# Patient Record
Sex: MALE | Race: WHITE | NOT HISPANIC OR LATINO | URBAN - METROPOLITAN AREA
[De-identification: names, ages, dates, MRNs, and addresses within clinical notes are randomized per-mention and may not be internally consistent; named-entity substitution may affect disease eponyms.]

---

## 2019-07-17 ENCOUNTER — AMBULATORY - HEALTHEAST (OUTPATIENT)
Dept: VASCULAR SURGERY | Facility: CLINIC | Age: 84
End: 2019-07-17

## 2019-07-17 DIAGNOSIS — L98.9 SKIN LESION: ICD-10-CM

## 2019-10-16 ENCOUNTER — OFFICE VISIT - HEALTHEAST (OUTPATIENT)
Dept: GERIATRICS | Facility: CLINIC | Age: 84
End: 2019-10-16

## 2019-10-16 ENCOUNTER — RECORDS - HEALTHEAST (OUTPATIENT)
Dept: LAB | Facility: CLINIC | Age: 84
End: 2019-10-16

## 2019-10-16 DIAGNOSIS — E78.5 DYSLIPIDEMIA: ICD-10-CM

## 2019-10-16 DIAGNOSIS — N17.9 ACUTE KIDNEY INJURY SUPERIMPOSED ON CKD (H): ICD-10-CM

## 2019-10-16 DIAGNOSIS — F02.80 LATE ONSET ALZHEIMER'S DISEASE WITHOUT BEHAVIORAL DISTURBANCE (H): ICD-10-CM

## 2019-10-16 DIAGNOSIS — G30.1 LATE ONSET ALZHEIMER'S DISEASE WITHOUT BEHAVIORAL DISTURBANCE (H): ICD-10-CM

## 2019-10-16 DIAGNOSIS — E44.0 MODERATE PROTEIN-CALORIE MALNUTRITION (H): ICD-10-CM

## 2019-10-16 DIAGNOSIS — C61 PROSTATE CANCER (H): ICD-10-CM

## 2019-10-16 DIAGNOSIS — N18.9 ACUTE KIDNEY INJURY SUPERIMPOSED ON CKD (H): ICD-10-CM

## 2019-10-16 DIAGNOSIS — R62.7 FAILURE TO THRIVE IN ADULT: ICD-10-CM

## 2019-10-16 DIAGNOSIS — R53.1 WEAKNESS: ICD-10-CM

## 2019-10-16 DIAGNOSIS — I10 ESSENTIAL HYPERTENSION: ICD-10-CM

## 2019-10-16 RX ORDER — ACETAMINOPHEN 500 MG
1000 TABLET ORAL 3 TIMES DAILY PRN
Status: SHIPPED | COMMUNITY
Start: 2019-10-16

## 2019-10-17 ENCOUNTER — OFFICE VISIT - HEALTHEAST (OUTPATIENT)
Dept: GERIATRICS | Facility: CLINIC | Age: 84
End: 2019-10-17

## 2019-10-17 DIAGNOSIS — F02.80 LATE ONSET ALZHEIMER'S DISEASE WITHOUT BEHAVIORAL DISTURBANCE (H): ICD-10-CM

## 2019-10-17 DIAGNOSIS — N17.9 ACUTE KIDNEY INJURY SUPERIMPOSED ON CKD (H): ICD-10-CM

## 2019-10-17 DIAGNOSIS — C61 PROSTATE CANCER (H): ICD-10-CM

## 2019-10-17 DIAGNOSIS — G30.1 LATE ONSET ALZHEIMER'S DISEASE WITHOUT BEHAVIORAL DISTURBANCE (H): ICD-10-CM

## 2019-10-17 DIAGNOSIS — I10 ESSENTIAL HYPERTENSION: ICD-10-CM

## 2019-10-17 DIAGNOSIS — N18.9 ACUTE KIDNEY INJURY SUPERIMPOSED ON CKD (H): ICD-10-CM

## 2019-10-17 LAB
ANION GAP SERPL CALCULATED.3IONS-SCNC: 5 MMOL/L (ref 5–18)
BUN SERPL-MCNC: 29 MG/DL (ref 8–28)
CALCIUM SERPL-MCNC: 9.6 MG/DL (ref 8.5–10.5)
CHLORIDE BLD-SCNC: 103 MMOL/L (ref 98–107)
CO2 SERPL-SCNC: 26 MMOL/L (ref 22–31)
CREAT SERPL-MCNC: 1.4 MG/DL (ref 0.7–1.3)
GFR SERPL CREATININE-BSD FRML MDRD: 48 ML/MIN/1.73M2
GLUCOSE BLD-MCNC: 116 MG/DL (ref 70–125)
POTASSIUM BLD-SCNC: 3.9 MMOL/L (ref 3.5–5)
SODIUM SERPL-SCNC: 134 MMOL/L (ref 136–145)

## 2019-10-25 ENCOUNTER — OFFICE VISIT - HEALTHEAST (OUTPATIENT)
Dept: GERIATRICS | Facility: CLINIC | Age: 84
End: 2019-10-25

## 2019-10-25 DIAGNOSIS — R53.1 WEAKNESS: ICD-10-CM

## 2019-10-25 DIAGNOSIS — I10 ESSENTIAL HYPERTENSION: ICD-10-CM

## 2019-10-25 DIAGNOSIS — G30.1 LATE ONSET ALZHEIMER'S DISEASE WITHOUT BEHAVIORAL DISTURBANCE (H): ICD-10-CM

## 2019-10-25 DIAGNOSIS — F02.80 LATE ONSET ALZHEIMER'S DISEASE WITHOUT BEHAVIORAL DISTURBANCE (H): ICD-10-CM

## 2019-10-25 DIAGNOSIS — R62.7 FAILURE TO THRIVE IN ADULT: ICD-10-CM

## 2019-10-28 ENCOUNTER — RECORDS - HEALTHEAST (OUTPATIENT)
Dept: LAB | Facility: CLINIC | Age: 84
End: 2019-10-28

## 2019-10-28 LAB
ANION GAP SERPL CALCULATED.3IONS-SCNC: 5 MMOL/L (ref 5–18)
BUN SERPL-MCNC: 33 MG/DL (ref 8–28)
CALCIUM SERPL-MCNC: 9.2 MG/DL (ref 8.5–10.5)
CHLORIDE BLD-SCNC: 103 MMOL/L (ref 98–107)
CO2 SERPL-SCNC: 28 MMOL/L (ref 22–31)
CREAT SERPL-MCNC: 1.32 MG/DL (ref 0.7–1.3)
GFR SERPL CREATININE-BSD FRML MDRD: 51 ML/MIN/1.73M2
GLUCOSE BLD-MCNC: 103 MG/DL (ref 70–125)
POTASSIUM BLD-SCNC: 4.4 MMOL/L (ref 3.5–5)
SODIUM SERPL-SCNC: 136 MMOL/L (ref 136–145)

## 2019-10-29 ENCOUNTER — OFFICE VISIT - HEALTHEAST (OUTPATIENT)
Dept: GERIATRICS | Facility: CLINIC | Age: 84
End: 2019-10-29

## 2019-10-29 DIAGNOSIS — I10 ESSENTIAL HYPERTENSION: ICD-10-CM

## 2019-10-29 DIAGNOSIS — E44.0 MODERATE PROTEIN-CALORIE MALNUTRITION (H): ICD-10-CM

## 2019-10-29 DIAGNOSIS — R50.9 FEVER, UNSPECIFIED FEVER CAUSE: ICD-10-CM

## 2019-10-29 DIAGNOSIS — R53.1 WEAKNESS: ICD-10-CM

## 2019-11-01 ENCOUNTER — RECORDS - HEALTHEAST (OUTPATIENT)
Dept: LAB | Facility: CLINIC | Age: 84
End: 2019-11-01

## 2019-11-04 LAB
ANION GAP SERPL CALCULATED.3IONS-SCNC: 9 MMOL/L (ref 5–18)
BUN SERPL-MCNC: 33 MG/DL (ref 8–28)
CALCIUM SERPL-MCNC: 9.2 MG/DL (ref 8.5–10.5)
CHLORIDE BLD-SCNC: 106 MMOL/L (ref 98–107)
CO2 SERPL-SCNC: 21 MMOL/L (ref 22–31)
CREAT SERPL-MCNC: 1.2 MG/DL (ref 0.7–1.3)
GFR SERPL CREATININE-BSD FRML MDRD: 57 ML/MIN/1.73M2
GLUCOSE BLD-MCNC: 91 MG/DL (ref 70–125)
POTASSIUM BLD-SCNC: 5 MMOL/L (ref 3.5–5)
SODIUM SERPL-SCNC: 136 MMOL/L (ref 136–145)

## 2019-11-05 ENCOUNTER — OFFICE VISIT - HEALTHEAST (OUTPATIENT)
Dept: GERIATRICS | Facility: CLINIC | Age: 84
End: 2019-11-05

## 2019-11-05 DIAGNOSIS — F02.80 LATE ONSET ALZHEIMER'S DISEASE WITHOUT BEHAVIORAL DISTURBANCE (H): ICD-10-CM

## 2019-11-05 DIAGNOSIS — R62.7 FAILURE TO THRIVE IN ADULT: ICD-10-CM

## 2019-11-05 DIAGNOSIS — I10 ESSENTIAL HYPERTENSION: ICD-10-CM

## 2019-11-05 DIAGNOSIS — G30.1 LATE ONSET ALZHEIMER'S DISEASE WITHOUT BEHAVIORAL DISTURBANCE (H): ICD-10-CM

## 2019-11-05 DIAGNOSIS — R53.1 WEAKNESS: ICD-10-CM

## 2019-11-11 ENCOUNTER — OFFICE VISIT - HEALTHEAST (OUTPATIENT)
Dept: GERIATRICS | Facility: CLINIC | Age: 84
End: 2019-11-11

## 2019-11-11 DIAGNOSIS — F02.80 LATE ONSET ALZHEIMER'S DISEASE WITHOUT BEHAVIORAL DISTURBANCE (H): ICD-10-CM

## 2019-11-11 DIAGNOSIS — R62.7 FAILURE TO THRIVE IN ADULT: ICD-10-CM

## 2019-11-11 DIAGNOSIS — E78.5 DYSLIPIDEMIA: ICD-10-CM

## 2019-11-11 DIAGNOSIS — G30.1 LATE ONSET ALZHEIMER'S DISEASE WITHOUT BEHAVIORAL DISTURBANCE (H): ICD-10-CM

## 2019-11-11 DIAGNOSIS — R53.1 WEAKNESS: ICD-10-CM

## 2019-11-13 ENCOUNTER — AMBULATORY - HEALTHEAST (OUTPATIENT)
Dept: GERIATRICS | Facility: CLINIC | Age: 84
End: 2019-11-13

## 2020-02-21 ENCOUNTER — COMMUNICATION - HEALTHEAST (OUTPATIENT)
Dept: GERIATRICS | Facility: CLINIC | Age: 85
End: 2020-02-21

## 2020-02-24 ENCOUNTER — OFFICE VISIT - HEALTHEAST (OUTPATIENT)
Dept: GERIATRICS | Facility: CLINIC | Age: 85
End: 2020-02-24

## 2020-02-24 DIAGNOSIS — C61 PROSTATE CANCER (H): ICD-10-CM

## 2020-02-24 DIAGNOSIS — N39.0 URINARY TRACT INFECTION WITHOUT HEMATURIA, SITE UNSPECIFIED: ICD-10-CM

## 2020-02-24 DIAGNOSIS — E44.0 MODERATE PROTEIN-CALORIE MALNUTRITION (H): ICD-10-CM

## 2020-02-24 DIAGNOSIS — R53.1 WEAKNESS: ICD-10-CM

## 2020-02-25 ENCOUNTER — OFFICE VISIT - HEALTHEAST (OUTPATIENT)
Dept: GERIATRICS | Facility: CLINIC | Age: 85
End: 2020-02-25

## 2020-02-25 DIAGNOSIS — R53.1 WEAKNESS: ICD-10-CM

## 2020-02-25 DIAGNOSIS — F02.80 LATE ONSET ALZHEIMER'S DISEASE WITHOUT BEHAVIORAL DISTURBANCE (H): ICD-10-CM

## 2020-02-25 DIAGNOSIS — I10 ESSENTIAL HYPERTENSION: ICD-10-CM

## 2020-02-25 DIAGNOSIS — N30.00 ACUTE CYSTITIS WITHOUT HEMATURIA: ICD-10-CM

## 2020-02-25 DIAGNOSIS — G30.1 LATE ONSET ALZHEIMER'S DISEASE WITHOUT BEHAVIORAL DISTURBANCE (H): ICD-10-CM

## 2020-02-27 ENCOUNTER — OFFICE VISIT - HEALTHEAST (OUTPATIENT)
Dept: GERIATRICS | Facility: CLINIC | Age: 85
End: 2020-02-27

## 2020-02-27 DIAGNOSIS — G30.1 LATE ONSET ALZHEIMER'S DISEASE WITHOUT BEHAVIORAL DISTURBANCE (H): ICD-10-CM

## 2020-02-27 DIAGNOSIS — F02.80 LATE ONSET ALZHEIMER'S DISEASE WITHOUT BEHAVIORAL DISTURBANCE (H): ICD-10-CM

## 2020-02-27 DIAGNOSIS — S99.829A TOENAIL TORN AWAY: ICD-10-CM

## 2020-02-27 DIAGNOSIS — R62.7 FAILURE TO THRIVE IN ADULT: ICD-10-CM

## 2020-03-04 ENCOUNTER — OFFICE VISIT - HEALTHEAST (OUTPATIENT)
Dept: GERIATRICS | Facility: CLINIC | Age: 85
End: 2020-03-04

## 2020-03-04 DIAGNOSIS — R62.7 FAILURE TO THRIVE IN ADULT: ICD-10-CM

## 2020-03-04 DIAGNOSIS — I10 ESSENTIAL HYPERTENSION: ICD-10-CM

## 2020-03-04 DIAGNOSIS — R53.1 WEAKNESS: ICD-10-CM

## 2020-03-10 ENCOUNTER — OFFICE VISIT - HEALTHEAST (OUTPATIENT)
Dept: GERIATRICS | Facility: CLINIC | Age: 85
End: 2020-03-10

## 2020-03-10 DIAGNOSIS — R62.7 FAILURE TO THRIVE IN ADULT: ICD-10-CM

## 2020-03-10 DIAGNOSIS — N39.0 URINARY TRACT INFECTION WITHOUT HEMATURIA, SITE UNSPECIFIED: ICD-10-CM

## 2020-03-10 DIAGNOSIS — R53.1 WEAKNESS: ICD-10-CM

## 2020-03-17 ENCOUNTER — OFFICE VISIT - HEALTHEAST (OUTPATIENT)
Dept: GERIATRICS | Facility: CLINIC | Age: 85
End: 2020-03-17

## 2020-03-17 DIAGNOSIS — G30.1 LATE ONSET ALZHEIMER'S DISEASE WITHOUT BEHAVIORAL DISTURBANCE (H): ICD-10-CM

## 2020-03-17 DIAGNOSIS — R53.1 WEAKNESS: ICD-10-CM

## 2020-03-17 DIAGNOSIS — R62.7 FAILURE TO THRIVE IN ADULT: ICD-10-CM

## 2020-03-17 DIAGNOSIS — F02.80 LATE ONSET ALZHEIMER'S DISEASE WITHOUT BEHAVIORAL DISTURBANCE (H): ICD-10-CM

## 2020-03-17 DIAGNOSIS — J90 PLEURAL EFFUSION: ICD-10-CM

## 2020-03-25 ENCOUNTER — OFFICE VISIT - HEALTHEAST (OUTPATIENT)
Dept: GERIATRICS | Facility: CLINIC | Age: 85
End: 2020-03-25

## 2020-03-25 DIAGNOSIS — R53.1 WEAKNESS: ICD-10-CM

## 2020-03-25 DIAGNOSIS — I10 ESSENTIAL HYPERTENSION: ICD-10-CM

## 2020-03-25 DIAGNOSIS — R62.7 FAILURE TO THRIVE IN ADULT: ICD-10-CM

## 2020-04-02 ENCOUNTER — OFFICE VISIT - HEALTHEAST (OUTPATIENT)
Dept: GERIATRICS | Facility: CLINIC | Age: 85
End: 2020-04-02

## 2020-04-02 DIAGNOSIS — F02.80 LATE ONSET ALZHEIMER'S DISEASE WITHOUT BEHAVIORAL DISTURBANCE (H): ICD-10-CM

## 2020-04-02 DIAGNOSIS — N39.0 URINARY TRACT INFECTION WITHOUT HEMATURIA, SITE UNSPECIFIED: ICD-10-CM

## 2020-04-02 DIAGNOSIS — I10 ESSENTIAL HYPERTENSION: ICD-10-CM

## 2020-04-02 DIAGNOSIS — N28.9 KIDNEY DISEASE: ICD-10-CM

## 2020-04-02 DIAGNOSIS — G30.1 LATE ONSET ALZHEIMER'S DISEASE WITHOUT BEHAVIORAL DISTURBANCE (H): ICD-10-CM

## 2020-04-06 ENCOUNTER — AMBULATORY - HEALTHEAST (OUTPATIENT)
Dept: GERIATRICS | Facility: CLINIC | Age: 85
End: 2020-04-06

## 2021-05-26 VITALS
TEMPERATURE: 98.4 F | OXYGEN SATURATION: 96 % | HEART RATE: 80 BPM | DIASTOLIC BLOOD PRESSURE: 62 MMHG | RESPIRATION RATE: 20 BRPM | SYSTOLIC BLOOD PRESSURE: 124 MMHG

## 2021-06-02 NOTE — PROGRESS NOTES
[facility]    Facility:   [facility] [[id number]]   Code Status: FULL CODE      CHIEF COMPLAINT/REASON FOR VISIT:  Chief Complaint   Patient presents with     Review Of Multiple Medical Conditions       HISTORY:      HPI: Federico is a 89 y.o. male undergoing physical and occupational therapy at [facility]. He is with a history of: prostate cancer, dementia and hypertension.  He was admitted on 10/11/2019, through [place] [facility], with failure to thrive and poor oral intake. CT head revealed no acute pathology.In the ER, he was found to be in SALLY.  He was provided IVF and Lisinopril was discontinued. Creatinine has returned to baseline.  Lactate was elevated and normalized quickly. There was no evidence for acute infectious process.    Today is seen for a routine visit to review multiple medical issues.  He denies chest pain or shortness of breath.  He is moving his bowels and denied urinary issues.  He is being followed closely by dietary and placed on supplements. He did have a recent 2 pound weight loss but overall he is up 10 pounds since 10/14/19.  Slightly abnormal BMP on 10/17 and as of 10/28/19 his creatinine has improved from 1.40 to 1.32 and GRF from 48 to 51. Labs to be rechecked on 11/4/19.  He contiues to  be oriented to himself and pleasantly confused.          Past Medical History:   Diagnosis Date     Cholelithiases     pt. unaware     CTS (carpal tunnel syndrome)     bilateral     HTN (hypertension)      IGT (impaired glucose tolerance)      Loss of hearing     bilateral hearing aids     Prostate cancer (H)      Renal insufficiency      Renal insufficiency              Family History   Problem Relation Age of Onset     Cancer Father      Heart disease Father      Heart disease Sister      Stroke Brother      Heart disease Sister      Social History     Socioeconomic History     Marital status:      Spouse name: Not on file     Number  of children: Not on file     Years of education: Not on file     Highest education level: Not on file   Occupational History     Not on file   Social Needs     Financial resource strain: Not on file     Food insecurity:     Worry: Not on file     Inability: Not on file     Transportation needs:     Medical: Not on file     Non-medical: Not on file   Tobacco Use     Smoking status: Never Smoker     Smokeless tobacco: Never Used   Substance and Sexual Activity     Alcohol use: Yes     Comment: once a month or less     Drug use: No     Sexual activity: Not on file   Lifestyle     Physical activity:     Days per week: Not on file     Minutes per session: Not on file     Stress: Not on file   Relationships     Social connections:     Talks on phone: Not on file     Gets together: Not on file     Attends Taoism service: Not on file     Active member of club or organization: Not on file     Attends meetings of clubs or organizations: Not on file     Relationship status: Not on file     Intimate partner violence:     Fear of current or ex partner: Not on file     Emotionally abused: Not on file     Physically abused: Not on file     Forced sexual activity: Not on file   Other Topics Concern     Not on file   Social History Narrative     Not on file         Review of Systems   Constitutional: Positive for activity change and appetite change. Negative for chills, fatigue and fever.        As current diagnosis of failure to thrive being followed closely by dietary.   HENT: Negative for congestion and sore throat.    Eyes: Negative for visual disturbance.   Respiratory: Negative for cough, shortness of breath and wheezing.    Cardiovascular: Negative for chest pain and leg swelling.   Gastrointestinal: Negative for abdominal distention, abdominal pain, constipation, diarrhea and nausea.   Genitourinary: Negative for dysuria.   Musculoskeletal: Negative for arthralgias and myalgias.   Skin: Negative for color change, rash and  wound.   Neurological: Negative for dizziness, weakness and numbness.   Psychiatric/Behavioral: Negative for agitation, behavioral problems and sleep disturbance.       Vitals:    10/29/19 0737   BP: 128/62   Pulse: 85   Resp: 16   Temp: 100.1  F (37.8  C)   SpO2: 98%   Weight: 155 lb 6.4 oz (70.5 kg)       Physical Exam  Constitutional:       Appearance: He is well-developed.      Comments: Pleasant gentleman in no acute distress   HENT:      Head: Normocephalic.   Eyes:      Conjunctiva/sclera: Conjunctivae normal.   Neck:      Musculoskeletal: Normal range of motion.   Cardiovascular:      Rate and Rhythm: Normal rate and regular rhythm.      Heart sounds: Normal heart sounds. No murmur.   Pulmonary:      Effort: No respiratory distress.      Breath sounds: Normal breath sounds. No wheezing or rales.   Abdominal:      General: Bowel sounds are normal. There is no distension.      Palpations: Abdomen is soft.      Tenderness: There is no tenderness.   Musculoskeletal: Normal range of motion.   Skin:     General: Skin is warm.   Neurological:      Mental Status: He is alert and oriented to person, place, and time.   Psychiatric:         Behavior: Behavior normal.           LABS:   Recent Results (from the past 240 hour(s))   Basic Metabolic Panel   Result Value Ref Range    Sodium 136 136 - 145 mmol/L    Potassium 4.4 3.5 - 5.0 mmol/L    Chloride 103 98 - 107 mmol/L    CO2 28 22 - 31 mmol/L    Anion Gap, Calculation 5 5 - 18 mmol/L    Glucose 103 70 - 125 mg/dL    Calcium 9.2 8.5 - 10.5 mg/dL    BUN 33 (H) 8 - 28 mg/dL    Creatinine 1.32 (H) 0.70 - 1.30 mg/dL    GFR MDRD Af Amer >60 >60 mL/min/1.73m2    GFR MDRD Non Af Amer 51 (L) >60 mL/min/1.73m2        Current Outpatient Medications   Medication Sig     acetaminophen (TYLENOL) 500 MG tablet Take 1,000 mg by mouth 3 (three) times a day as needed for pain.     aspirin 81 MG EC tablet Take 81 mg by mouth every evening.            docoshexanoic acid-eicosapent 500  mg (FISH OIL) 500-100 mg cap capsule Take 500 mg by mouth daily.     enzalutamide (XTANDI) 40 mg cap Take 1 tablet by mouth daily.     leuprolide (LUPRON) 30 mg injection Inject 30 mg into the shoulder, thigh, or buttocks every 6 (six) months.      multivitamin therapeutic tablet Take 1 tablet by mouth daily.   .ASSESSMENT:      ICD-10-CM    1. Weakness R53.1    2. Essential hypertension I10    3. Fever, unspecified fever cause R50.9    4. Moderate protein-calorie malnutrition (H) E44.0        PLAN:    Weakness PT OT    Hypertension currently off of his lisinopril and blood pressure stable at 128/62    Dementia noted to be oriented to self only pleasantly confused.  Provide a safe and comfortable environment    Failure to thrive being followed closely by dietary currently showing a 2 pound weight loss in the last 10 days, on supplements but overall up 10 pounds since 10/14/19.     Acute kidney injury last BMP was done 10/28/19 Creatine and GFR improving  on 1017 and labs are improving.    Continue to monitor labs    Electronically signed by: Elissa Diaz, CNP

## 2021-06-02 NOTE — PROGRESS NOTES
[facility]    Facility:   [facility] [[id number]]   Code Status: FULL CODE      CHIEF COMPLAINT/REASON FOR VISIT:  Chief Complaint   Patient presents with     Review Of Multiple Medical Conditions       HISTORY:      HPI: Federico is a 89 y.o. male undergoing physical and occupational therapy at [facility]. He is with a history of: prostate cancer, dementia and hypertension.  He was admitted on 10/11/2019, through [place] [facility], with failure to thrive and poor oral intake. CT head revealed no acute pathology.In the ER, he was found to be in SALLY.  He was provided IVF and Lisinopril was discontinued. Creatinine has returned to baseline.  Lactate was elevated and normalized quickly. There was no evidence for acute infectious process.    Today is seen for a routine visit to review multiple medical issues.  He denies chest pain or shortness of breath.  He is moving his bowels and denied urinary issues.  He is being followed closely by dietary and placed on supplements however he does show a 2  pound weight loss in the last 10 days and I did update dietary on this finding.  Slightly abnormal BMP on 10/17 and this is being  monitored.  He is noted to be oriented to himself and pleasantly confused.          Past Medical History:   Diagnosis Date     Cholelithiases     pt. unaware     CTS (carpal tunnel syndrome)     bilateral     HTN (hypertension)      IGT (impaired glucose tolerance)      Loss of hearing     bilateral hearing aids     Prostate cancer (H)      Renal insufficiency      Renal insufficiency              Family History   Problem Relation Age of Onset     Cancer Father      Heart disease Father      Heart disease Sister      Stroke Brother      Heart disease Sister      Social History     Socioeconomic History     Marital status:      Spouse name: Not on file     Number of children: Not on file     Years of education: Not on file     Highest  education level: Not on file   Occupational History     Not on file   Social Needs     Financial resource strain: Not on file     Food insecurity:     Worry: Not on file     Inability: Not on file     Transportation needs:     Medical: Not on file     Non-medical: Not on file   Tobacco Use     Smoking status: Never Smoker     Smokeless tobacco: Never Used   Substance and Sexual Activity     Alcohol use: Yes     Comment: once a month or less     Drug use: No     Sexual activity: Not on file   Lifestyle     Physical activity:     Days per week: Not on file     Minutes per session: Not on file     Stress: Not on file   Relationships     Social connections:     Talks on phone: Not on file     Gets together: Not on file     Attends Orthodox service: Not on file     Active member of club or organization: Not on file     Attends meetings of clubs or organizations: Not on file     Relationship status: Not on file     Intimate partner violence:     Fear of current or ex partner: Not on file     Emotionally abused: Not on file     Physically abused: Not on file     Forced sexual activity: Not on file   Other Topics Concern     Not on file   Social History Narrative     Not on file         Review of Systems   Constitutional: Positive for activity change and appetite change. Negative for chills, fatigue and fever.        As current diagnosis of failure to thrive being followed closely by dietary.   HENT: Negative for congestion and sore throat.    Eyes: Negative for visual disturbance.   Respiratory: Negative for cough, shortness of breath and wheezing.    Cardiovascular: Negative for chest pain and leg swelling.   Gastrointestinal: Negative for abdominal distention, abdominal pain, constipation, diarrhea and nausea.   Genitourinary: Negative for dysuria.   Musculoskeletal: Negative for arthralgias and myalgias.   Skin: Negative for color change, rash and wound.   Neurological: Negative for dizziness, weakness and numbness.    Psychiatric/Behavioral: Negative for agitation, behavioral problems and sleep disturbance.       Vitals:    10/25/19 0729   BP: 122/78   Pulse: 78   Resp: 16   Temp: 97.3  F (36.3  C)   SpO2: 98%   Weight: 143 lb 6.4 oz (65 kg)       Physical Exam  Constitutional:       Appearance: He is well-developed.      Comments: Pleasant gentleman in no acute distress   HENT:      Head: Normocephalic.   Eyes:      Conjunctiva/sclera: Conjunctivae normal.   Neck:      Musculoskeletal: Normal range of motion.   Cardiovascular:      Rate and Rhythm: Normal rate and regular rhythm.      Heart sounds: Normal heart sounds. No murmur.   Pulmonary:      Effort: No respiratory distress.      Breath sounds: Normal breath sounds. No wheezing or rales.   Abdominal:      General: Bowel sounds are normal. There is no distension.      Palpations: Abdomen is soft.      Tenderness: There is no tenderness.   Musculoskeletal: Normal range of motion.   Skin:     General: Skin is warm.   Neurological:      Mental Status: He is alert and oriented to person, place, and time.   Psychiatric:         Behavior: Behavior normal.           LABS:   No results found for this or any previous visit (from the past 240 hour(s)).     Current Outpatient Medications   Medication Sig     acetaminophen (TYLENOL) 500 MG tablet Take 1,000 mg by mouth 3 (three) times a day as needed for pain.     aspirin 81 MG EC tablet Take 81 mg by mouth every evening.            docoshexanoic acid-eicosapent 500 mg (FISH OIL) 500-100 mg cap capsule Take 500 mg by mouth daily.     enzalutamide (XTANDI) 40 mg cap Take 1 tablet by mouth daily.     leuprolide (LUPRON) 30 mg injection Inject 30 mg into the shoulder, thigh, or buttocks every 6 (six) months.      multivitamin therapeutic tablet Take 1 tablet by mouth daily.   .ASSESSMENT:      ICD-10-CM    1. Weakness R53.1    2. Essential hypertension I10    3. Late onset Alzheimer's disease without behavioral disturbance (H) G30.1      F02.80    4. Failure to thrive in adult R62.7        PLAN:    Weakness PT OT    Hypertension currently off of his lisinopril and blood pressure stable at 122/78    Dementia noted to be oriented to self only pleasantly confused.  Provide a safe and comfortable environment    Failure to thrive being followed closely by dietary currently showing a 2 pound weight loss in the last 10 days, on supplements    Acute kidney injury last BMP was done on 1017 and labs are improving.  His creatinine is down to 1.40 from 1.71 and his GFR is up to 48 from 17.  Continue to monitor labs    Electronically signed by: Elissa Diaz, CNP

## 2021-06-02 NOTE — PROGRESS NOTES
[facility]    DATE: 10/16/2019    NAME:  Federico Epps             :  10/26/1930  MRN: [id number]  CODE STATUS:  FULL CODE    VISIT TYPE: Problem Visit (hospital f/u)     FACILITY:  [facility] [[id number]]       CHIEF COMPLAIN/REASON FOR VISIT:    Chief Complaint   Patient presents with     Problem Visit     hospital f/u               HISTORY OF PRESENT ILLNESS: Federico Epps is a 88 y.o. male who was admitted to [facility] 10/11-10/14 for anorexia, weakness, failure to thrive. He was treated for SALLY on CKD with IV fluids. Lisinopril was discontinued due to SALLY. Infectious workup was negative. He was recommended to discharge to TCU for rehab. He has PMH of prostate cancer, dementia, hypertension. Prior to this he lived with wife.     Today Mr. Epps is seen for hospital follow up today. He is seen in wheelchair in his room with no family present. He is confused but calm and cooperative. He says he sleeps fine and has no pain. He thinks his appetite is good and has not had any stomach upset or nausea. He denies shortness of breath or cough. He doesn't know how therapy is going. He thinks bowels are ok but doesn't know when he last went. He is not sure if he has eaten today. He is unable to state where he is, why he is here, or what month or year it is. He is quite hard of hearing as well but has hearing aids in place. Per staff he has been quiet and not complaining of anything. Staff denies any concerns with him. His vitals have been stable and admission weight was 145lbs. He is not on many medications. He has a bmp that was ordered by the hospital for tomorrow. His appetite is fair per staff.     REVIEW OF SYSTEMS:  PROBLEMS AND REVIEW OF SYSTEMS:   Today on ROS per staff and patient:   Currently, no fever, chills, or rigors. Decreased vision and hearing. Denies any chest pain, dizziness, shortness of breath, or cough.  Positive for  weakness, confusion, forgetfulness, appetite fair, unable to obtain further ROS due to cognition      Allergies   Allergen Reactions     Other Food Allergy Anaphylaxis     Peanuts caused edema / throat swelling / closing     Current Outpatient Medications   Medication Sig     acetaminophen (TYLENOL) 500 MG tablet Take 1,000 mg by mouth 3 (three) times a day as needed for pain.     enzalutamide (XTANDI) 40 mg cap Take 1 tablet by mouth daily.     multivitamin therapeutic tablet Take 1 tablet by mouth daily.     aspirin 81 MG EC tablet Take 81 mg by mouth every evening.            docoshexanoic acid-eicosapent 500 mg (FISH OIL) 500-100 mg cap capsule Take 500 mg by mouth daily.     leuprolide (LUPRON) 30 mg injection Inject 30 mg into the shoulder, thigh, or buttocks every 6 (six) months.      Past Medical History:    Past Medical History:   Diagnosis Date     Cholelithiases     pt. unaware     CTS (carpal tunnel syndrome)     bilateral     HTN (hypertension)      IGT (impaired glucose tolerance)      Loss of hearing     bilateral hearing aids     Prostate cancer (H)      Renal insufficiency      Renal insufficiency            PHYSICAL EXAMINATION  Vitals:    10/16/19 0700   BP: 124/58   Pulse: 67   Resp: 18   Temp: 98.7  F (37.1  C)   SpO2: 93%   Weight: 145 lb (65.8 kg)       Today on physical exam:     GENERAL: Awake, Alert, not in any form of acute distress, answers questions appropriately, follows simple commands  HEENT: Head is normocephalic with normal hair distribution. No evidence of trauma. Ears: No acute purulent discharge. Eyes: Conjunctivae pink with no scleral jaundice. Nose: Normal mucosa and septum. NECK: Supple with no cervical or supraclavicular lymphadenopathy. Trachea is midline. [place]  CHEST: No tenderness or deformity, no crepitus  LUNG: dim to auscultation with good chest expansion. There are no crackles or wheezes, normal AP diameter. No shortness of breath or cough noted  BACK: No kyphosis of  the thoracic spine. Symmetric, no curvature, ROM normal, no CVA tenderness, no spinal tenderness   CVS: There is good S1  S2, regular rhythm, there are no murmurs, rubs, gallops, or heaves,  2+ pulses symmetric in all extremities.  ABDOMEN: Rounded and soft, nontender to palpation, non distended, no masses, no organomegaly, good bowel sounds, no rebound or guarding, no peritoneal signs.   EXTREMITIES: trace ble pedal edema, Atraumatic. Full range of motion on both upper and lower extremities, there is no tenderness to palpation, no cyanosis or clubbing, no calf tenderness.  SKIN: Warm and dry, no erythema noted.  Skin color, texture, no rashes or lesions.  NEURO/PSYCH: The patient is oriented to person, pleasantly confused, flat affect, socially withdrawn, some delayed responses, weakness.            LABS:   No results found for this or any previous visit (from the past 168 hour(s)).  Results for orders placed or performed in visit on 10/21/15   Basic Metabolic Panel   Result Value Ref Range    Sodium 138 136 - 145 mmol/L    Potassium 4.7 3.5 - 5.0 mmol/L    Chloride 101 98 - 107 mmol/L    CO2 26 22 - 31 mmol/L    Anion Gap, Calculation 11 5 - 18 mmol/L    Glucose 135 (H) 70 - 125 mg/dL    Calcium 10.1 8.5 - 10.5 mg/dL    BUN 25 8 - 28 mg/dL    Creatinine 1.45 (H) 0.70 - 1.30 mg/dL    GFR MDRD Af Amer 56 (L) >60 mL/min/1.73m2    GFR MDRD Non Af Amer 46 (L) >60 mL/min/1.73m2         Lab Results   Component Value Date    WBC 9.0 10/21/2015    HGB 11.2 (L) 10/21/2015    HCT 33.6 (L) 10/21/2015    MCV 89 10/21/2015     10/21/2015       No results found for: [id number]  No results found for: HGBA1C  Lab Results   Component Value Date    INR 1.02 09/22/2015     No results found for: [id number]  No results found for: TSH        ASSESSMENT/PLAN:    1. SALLY on CKD: Lisinopril stopped. Cr improved to 1.71 by 10/13. Imer 3.37, gfr 17. Bmp already ordered for 10/17. Encourage fluids, monitor.   2. Failure to thrive,  malnutrition: unclear amount of weight loss over last few months but poor intake at home. Per pcp note on 10/11 had lost 10lbs over last 2 months. Appetite fair here. Admission weight 145lbs. Monitor weights weekly. Dietary consult to follow for preferences and supplement recs.   3. HTN: SBP running 100-140s, 120s today. Overall well controlled off lisinopril. Monitor for now.   4. Dementia: a/o x 1 today. Unclear baseline as no family present. Supportive cares, monitor. No agitation, behaviors overnight. Provide safe environment.  5. Prostate cancer: On lupron injections q4 months, xtandi daily (family to supply). No concerns today.   6. HLD: On aspirin, fish oil. Unclear if aspirin for primary prevention. Would consider risk v benefit but defer to primary provider.   7. Pain, fever: tylenol ordered prn. No complaints today.   7.   Electronically signed by: Vanessa Jensen NP          Total floor/unit time spent 35 with 25 time spent on counseling and coordination of care. Counseling was done regarding hospital course, med changes, management of SALLY on CKD, hypertension management, dementia management and safety concerns, therapy needs. coordinated care with nursing for management of sally on ckd, weakness, dementia, hypertension, lab monitoring.

## 2021-06-02 NOTE — PROGRESS NOTES
[facility]      Facility:    [facility] [[id number]]  Code Status: FULL CODE       Chief Complaint/Reason for Visit:  Chief Complaint   Patient presents with     H & P     Admit note to TCU-FTT, prostate cancer, dementia, SALLY on CKD.       HPI:   Federico is a 89 y.o. male with hx of prostate cancer, HTN, dementia, presented to the hospital on 10/11/19 with info as noted below.     Hospital Course:   Mr. Federico Epps is an 88 y.o. male with a history of: prostate cancer, dementia and hypertension.  He was admitted on 10/11/2019, through [place] [facility], with failure to thrive and poor oral intake. He is a rather poor historian due to underlying dementia. He was admitted to inpatient. CT head revealed no acute pathology. In the ER, he was found to be in SALLY.  He was provided IVF and Lisinopril was discontinued. Creatinine has returned to baseline. Lactate was elevated and normalized quickly. There was no evidence for acute infectious process. Therapies were consulted and recommended TCU at discharge. Family were in agreement. Lisinopril was discontinued due to SALLY/CKD and his blood pressure has been stable. Could consider alternative antihypertensive agent, if needed.    Overall stabilized and discharged to TCU on 10/14/19 for PT, OT, nursing cares, medical management and monitoring.     Today:  Due to dementia, unable to obtain full accurate ROS. No family present at time of visit. Nursing reports no concerns. He denies any complaints. No hypoxia or need for oxygen. No fever or cough noted. Appetite is fair. No vomiting, diarrhea constipation. No complaints of abdominal pain. He denies shortness of breath or chest pain. He is [place], no reported new vision problems.       Past Medical History:  Past Medical History:   Diagnosis Date     Cholelithiases     pt. unaware     CTS (carpal tunnel syndrome)     bilateral     HTN (hypertension)      IGT (impaired glucose tolerance)       Loss of hearing     bilateral hearing aids     Prostate cancer (H)      Renal insufficiency      Renal insufficiency            Surgical History:  Past Surgical History:   Procedure Laterality Date     CATARACT EXTRACTION Bilateral      HERNIA REPAIR Left      MI REVISE MEDIAN N/CARPAL TUNNEL SURG Right 3/31/2015    Procedure: RIGHT CARPAL TUNNEL RELEASE;  Surgeon: Henry Mora MD;  Location: [facility];  Service: Orthopedics     PROSTATECTOMY  2010    cryoablation     TOTAL HIP ARTHROPLASTY Bilateral        Family History:   Family History   Problem Relation Age of Onset     Cancer Father      Heart disease Father      Heart disease Sister      Stroke Brother      Heart disease Sister        Social History:    Social History     Socioeconomic History     Marital status:      Spouse name: Not on file     Number of children: Not on file     Years of education: Not on file     Highest education level: Not on file   Occupational History     Not on file   Social Needs     Financial resource strain: Not on file     Food insecurity:     Worry: Not on file     Inability: Not on file     Transportation needs:     Medical: Not on file     Non-medical: Not on file   Tobacco Use     Smoking status: Never Smoker     Smokeless tobacco: Never Used   Substance and Sexual Activity     Alcohol use: Yes     Comment: once a month or less     Drug use: No     Sexual activity: Not on file   Lifestyle     Physical activity:     Days per week: Not on file     Minutes per session: Not on file     Stress: Not on file   Relationships     Social connections:     Talks on phone: Not on file     Gets together: Not on file     Attends Congregation service: Not on file     Active member of club or organization: Not on file     Attends meetings of clubs or organizations: Not on file     Relationship status: Not on file     Intimate partner violence:     Fear of current or ex partner: Not on file     Emotionally abused: Not on  file     Physically abused: Not on file     Forced sexual activity: Not on file   Other Topics Concern     Not on file   Social History Narrative     Not on file       Medications:  Current Outpatient Medications   Medication Sig     acetaminophen (TYLENOL) 500 MG tablet Take 1,000 mg by mouth 3 (three) times a day as needed for pain.     aspirin 81 MG EC tablet Take 81 mg by mouth every evening.            docoshexanoic acid-eicosapent 500 mg (FISH OIL) 500-100 mg cap capsule Take 500 mg by mouth daily.     enzalutamide (XTANDI) 40 mg cap Take 1 tablet by mouth daily.     leuprolide (LUPRON) 30 mg injection Inject 30 mg into the shoulder, thigh, or buttocks every 6 (six) months.      multivitamin therapeutic tablet Take 1 tablet by mouth daily.       Allergies:  Allergies   Allergen Reactions     Other Food Allergy Anaphylaxis     Peanuts caused edema / throat swelling / closing        Review of Systems:  Pertinent items are noted in HPI. unable to complete due to dementia.      Physical Exam:   General: Patient is alert elderly male, no distress.   Vitals: /62, Temp 98.8, Pulse 77 , RR 18, O2 sat 100% RA.  HEENT: Head is NCAT. Eyes show no injection or icterus. Nares negative. Oropharynx well hydrated.  Neck: Supple. No tenderness or adenopathy. No JVD.  Lungs: Clear bilaterally. No wheezes.  Cardiovascular: Regular rate and rhythm, normal S1, S2.  Back: No spinal or CVA tenderness.  Abdomen: Soft, no tenderness on exam. Bowel sounds present. No guarding rebound or rigidity.  : Deferred.  Extremities: No edema is noted.  Musculoskeletal: Age related degen changes.  Skin: Warm and dry.   Psych: Difficult to assess due to dementia.      Labs:  Results for orders placed or performed in visit on 10/17/19   Basic Metabolic Panel   Result Value Ref Range    Sodium 134 (L) 136 - 145 mmol/L    Potassium 3.9 3.5 - 5.0 mmol/L    Chloride 103 98 - 107 mmol/L    CO2 26 22 - 31 mmol/L    Anion Gap, Calculation 5 5 - 18  mmol/L    Glucose 116 70 - 125 mg/dL    Calcium 9.6 8.5 - 10.5 mg/dL    BUN 29 (H) 8 - 28 mg/dL    Creatinine 1.40 (H) 0.70 - 1.30 mg/dL    GFR MDRD Af Amer 58 (L) >60 mL/min/1.73m2    GFR MDRD Non Af Amer 48 (L) >60 mL/min/1.73m2       Assessment/Plan:  1. Dementia. Not on dementia meds. Confused at baseline. Lives with wife.  2. HTN. BPs satisfactory. Not currently on BP meds,  lisinopril was discontinued in the hospital. Continue to monitor for any need for meds.  3. Prostate cancer. He is on Xtandi. Follow up per usual with urology.  4. SALLY. On CKD. Labs as noted above. Check periodically.  5. FTT. Overall not doing well at home, advanced age, dementia. SW involved for consideration of alternative living situation.  6. Code status is full code.          Electronically signed by: Yahaira Carmen MD

## 2021-06-03 VITALS
BODY MASS INDEX: 22.18 KG/M2 | RESPIRATION RATE: 20 BRPM | HEART RATE: 78 BPM | TEMPERATURE: 98.3 F | SYSTOLIC BLOOD PRESSURE: 146 MMHG | WEIGHT: 141.6 LBS | OXYGEN SATURATION: 92 % | DIASTOLIC BLOOD PRESSURE: 70 MMHG

## 2021-06-03 VITALS
RESPIRATION RATE: 16 BRPM | WEIGHT: 143.4 LBS | OXYGEN SATURATION: 98 % | DIASTOLIC BLOOD PRESSURE: 78 MMHG | TEMPERATURE: 97.3 F | HEART RATE: 78 BPM | SYSTOLIC BLOOD PRESSURE: 122 MMHG | BODY MASS INDEX: 22.46 KG/M2

## 2021-06-03 VITALS
RESPIRATION RATE: 16 BRPM | WEIGHT: 155.4 LBS | BODY MASS INDEX: 24.34 KG/M2 | TEMPERATURE: 100.1 F | OXYGEN SATURATION: 98 % | SYSTOLIC BLOOD PRESSURE: 128 MMHG | DIASTOLIC BLOOD PRESSURE: 62 MMHG | HEART RATE: 85 BPM

## 2021-06-03 VITALS
RESPIRATION RATE: 18 BRPM | DIASTOLIC BLOOD PRESSURE: 58 MMHG | TEMPERATURE: 98.7 F | OXYGEN SATURATION: 93 % | BODY MASS INDEX: 22.71 KG/M2 | SYSTOLIC BLOOD PRESSURE: 124 MMHG | WEIGHT: 145 LBS | HEART RATE: 67 BPM

## 2021-06-03 VITALS
HEART RATE: 76 BPM | OXYGEN SATURATION: 97 % | SYSTOLIC BLOOD PRESSURE: 116 MMHG | WEIGHT: 142.8 LBS | BODY MASS INDEX: 22.37 KG/M2 | RESPIRATION RATE: 18 BRPM | TEMPERATURE: 97.2 F | DIASTOLIC BLOOD PRESSURE: 64 MMHG

## 2021-06-03 NOTE — PROGRESS NOTES
[facility]    Facility:   [facility] [[id number]]   Code Status: FULL CODE      CHIEF COMPLAINT/REASON FOR VISIT:  Chief Complaint   Patient presents with     Review Of Multiple Medical Conditions       HISTORY:      HPI: Federico is a 89 y.o. male undergoing physical and occupational therapy at [facility]. He is with a history of: prostate cancer, dementia and hypertension.  He was admitted on 10/11/2019, through [place] [facility], with failure to thrive and poor oral intake. CT head revealed no acute pathology.In the ER, he was found to be in SALLY.  He was provided IVF and Lisinopril was discontinued. Creatinine has returned to baseline.  Lactate was elevated and normalized quickly. There was no evidence for acute infectious process.    Today is seen for a routine visit to review multiple medical issues.  He denies chest pain or shortness of breath.  He is moving his bowels and denied urinary issues.  He is being followed closely by dietary and placed on supplements.He had a slightly  abnormal BMP on 10/17 and as of 10/28/19 his creatinine has improved from 1.40 to 1.32 and GRF from 48 to 51. Labs were rechecked on 11/4/19 and his creatinine is now 1.20 and GFR 57.   He contiues to  be oriented to himself and pleasantly confused. It appears a previous weight was incorrect and according to his weights he is down 2.6 pounds since admission (2 weeks).         Past Medical History:   Diagnosis Date     Cholelithiases     pt. unaware     CTS (carpal tunnel syndrome)     bilateral     HTN (hypertension)      IGT (impaired glucose tolerance)      Loss of hearing     bilateral hearing aids     Prostate cancer (H)      Renal insufficiency      Renal insufficiency              Family History   Problem Relation Age of Onset     Cancer Father      Heart disease Father      Heart disease Sister      Stroke Brother      Heart disease Sister      Social History      Socioeconomic History     Marital status:      Spouse name: Not on file     Number of children: Not on file     Years of education: Not on file     Highest education level: Not on file   Occupational History     Not on file   Social Needs     Financial resource strain: Not on file     Food insecurity:     Worry: Not on file     Inability: Not on file     Transportation needs:     Medical: Not on file     Non-medical: Not on file   Tobacco Use     Smoking status: Never Smoker     Smokeless tobacco: Never Used   Substance and Sexual Activity     Alcohol use: Yes     Comment: once a month or less     Drug use: No     Sexual activity: Not on file   Lifestyle     Physical activity:     Days per week: Not on file     Minutes per session: Not on file     Stress: Not on file   Relationships     Social connections:     Talks on phone: Not on file     Gets together: Not on file     Attends Anabaptist service: Not on file     Active member of club or organization: Not on file     Attends meetings of clubs or organizations: Not on file     Relationship status: Not on file     Intimate partner violence:     Fear of current or ex partner: Not on file     Emotionally abused: Not on file     Physically abused: Not on file     Forced sexual activity: Not on file   Other Topics Concern     Not on file   Social History Narrative     Not on file         Review of Systems   Constitutional: Positive for activity change and appetite change. Negative for chills, fatigue and fever.        As current diagnosis of failure to thrive being followed closely by dietary.   HENT: Negative for congestion and sore throat.    Eyes: Negative for visual disturbance.   Respiratory: Negative for cough, shortness of breath and wheezing.    Cardiovascular: Negative for chest pain and leg swelling.   Gastrointestinal: Negative for abdominal distention, abdominal pain, constipation, diarrhea and nausea.   Genitourinary: Negative for dysuria.    Musculoskeletal: Negative for arthralgias and myalgias.   Skin: Negative for color change, rash and wound.   Neurological: Negative for dizziness, weakness and numbness.   Psychiatric/Behavioral: Negative for agitation, behavioral problems and sleep disturbance.       Vitals:    11/05/19 0805   BP: 116/64   Pulse: 76   Resp: 18   Temp: 97.2  F (36.2  C)   SpO2: 97%   Weight: 142 lb 12.8 oz (64.8 kg)       Physical Exam  Constitutional:       Appearance: He is well-developed.      Comments: Pleasant gentleman in no acute distress   HENT:      Head: Normocephalic.   Eyes:      Conjunctiva/sclera: Conjunctivae normal.   Neck:      Musculoskeletal: Normal range of motion.   Cardiovascular:      Rate and Rhythm: Normal rate and regular rhythm.      Heart sounds: Normal heart sounds. No murmur.   Pulmonary:      Effort: No respiratory distress.      Breath sounds: Normal breath sounds. No wheezing or rales.   Abdominal:      General: Bowel sounds are normal. There is no distension.      Palpations: Abdomen is soft.      Tenderness: There is no tenderness.   Musculoskeletal: Normal range of motion.   Skin:     General: Skin is warm.   Neurological:      Mental Status: He is alert and oriented to person, place, and time.   Psychiatric:         Behavior: Behavior normal.           LABS:   Recent Results (from the past 240 hour(s))   Basic Metabolic Panel   Result Value Ref Range    Sodium 136 136 - 145 mmol/L    Potassium 4.4 3.5 - 5.0 mmol/L    Chloride 103 98 - 107 mmol/L    CO2 28 22 - 31 mmol/L    Anion Gap, Calculation 5 5 - 18 mmol/L    Glucose 103 70 - 125 mg/dL    Calcium 9.2 8.5 - 10.5 mg/dL    BUN 33 (H) 8 - 28 mg/dL    Creatinine 1.32 (H) 0.70 - 1.30 mg/dL    GFR MDRD Af Amer >60 >60 mL/min/1.73m2    GFR MDRD Non Af Amer 51 (L) >60 mL/min/1.73m2   Basic Metabolic Panel   Result Value Ref Range    Sodium 136 136 - 145 mmol/L    Potassium 5.0 3.5 - 5.0 mmol/L    Chloride 106 98 - 107 mmol/L    CO2 21 (L) 22 - 31  mmol/L    Anion Gap, Calculation 9 5 - 18 mmol/L    Glucose 91 70 - 125 mg/dL    Calcium 9.2 8.5 - 10.5 mg/dL    BUN 33 (H) 8 - 28 mg/dL    Creatinine 1.20 0.70 - 1.30 mg/dL    GFR MDRD Af Amer >60 >60 mL/min/1.73m2    GFR MDRD Non Af Amer 57 (L) >60 mL/min/1.73m2        Current Outpatient Medications   Medication Sig     acetaminophen (TYLENOL) 500 MG tablet Take 1,000 mg by mouth 3 (three) times a day as needed for pain.     aspirin 81 MG EC tablet Take 81 mg by mouth every evening.            docoshexanoic acid-eicosapent 500 mg (FISH OIL) 500-100 mg cap capsule Take 500 mg by mouth daily.     enzalutamide (XTANDI) 40 mg cap Take 1 tablet by mouth daily.     leuprolide (LUPRON) 30 mg injection Inject 30 mg into the shoulder, thigh, or buttocks every 6 (six) months.      multivitamin therapeutic tablet Take 1 tablet by mouth daily.   .ASSESSMENT:      ICD-10-CM    1. Weakness R53.1    2. Essential hypertension I10    3. Failure to thrive in adult R62.7    4. Late onset Alzheimer's disease without behavioral disturbance (H) G30.1     F02.80        PLAN:    Weakness PT OT    Hypertension currently off of his lisinopril and blood pressure stable at 116/64    Dementia noted to be oriented to self only pleasantly confused.  Provide a safe and comfortable environment    Failure to thrive being followed closely by dietary. currently showing a 2.6 pound weight loss in the last 14 days, on supplements.     Acute kidney injury last BMP was done 11/4/19- greatly improved- see above.     Electronically signed by: Elissa Diaz, CNP

## 2021-06-03 NOTE — PROGRESS NOTES
[facility]    Facility:   [facility] [[id number]]   Code Status: DNR  PCP: Jaime Watkins MD   Phone: [phone]   Fax: [phone]      CHIEF COMPLAINT/REASON FOR VISIT:  Chief Complaint   Patient presents with     Discharge Summary       HISTORY COURSE:  Federico is a 89 y.o. male undergoing physical and occupational therapy at [facility]. He is with a history of: prostate cancer, dementia and hypertension.  He was admitted on 10/11/2019, through [place] [facility], with failure to thrive and poor oral intake. CT head revealed no acute pathology.In the ER, he was found to be in SALLY.  He was provided IVF and Lisinopril was discontinued. Creatinine has returned to baseline.  Lactate was elevated and normalized quickly. There was no evidence for acute infectious process.     Today is seen for a face-to-face for discharge.  He will discharge to [facility] on 11/12/2019 with current medications and treatments.  He will also have [facility] home care services patient does need maximum assist with cares. He denies chest pain or shortness of breath.  He is moving his bowels and denied urinary issues.    He continues on supplements.He had a slightly  abnormal BMP on 10/17 and as of 10/28/19 his creatinine has improved from 1.40 to 1.32 and GRF from 48 to 51. Labs were rechecked on 11/4/19 and his creatinine is now 1.20 and GFR 57.   He contiues to  be oriented to himself and pleasantly confused.      Wheelchair face-to-face my patient Federico Epps will need a wheelchair for discharge due to the diagnosis of adult failure to thrive, osteoarthritis, malignant neoplasm of prostate, protein calorie malnutrition, difficulty walking my patient has the following limitations of mobility and ability to participate in  ADL as such as food prep, dressing, transferring, and ambulation.  The patient's mobility limitations cannot be sufficiently and safely resolved by use of a  cane or walker.  His current home has adequate space for maneuvering a manual wheelchair the patient and caregiver able to safely use a manual wheelchair, the patient will use the wheelchair daily, use of the wheelchair will improve the participation in MR ADL less for the patient.  My patient will require and benefit from an 18 inch x 18 inch standard manual wheelchair with bilateral standard foot rest, bilateral full-length armrests and 18 inch x 18 inch cushion.  A cushion is necessary since my patient sits in a wheelchair for greater than 8 hours/day placing him at high risk for skin breakdown.  This equipment is necessary for the duration of the patient's lifetime.    Review of Systems  Constitutional: Positive for activity change and appetite change. Negative for chills, fatigue and fever.        As current diagnosis of failure to thrive being followed closely by dietary.   HENT: Negative for congestion and sore throat.    Eyes: Negative for visual disturbance.   Respiratory: Negative for cough, shortness of breath and wheezing.    Cardiovascular: Negative for chest pain and leg swelling.   Gastrointestinal: Negative for abdominal distention, abdominal pain, constipation, diarrhea and nausea.   Genitourinary: Negative for dysuria.   Musculoskeletal: Negative for arthralgias and myalgias.   Skin: Negative for color change, rash and wound.   Neurological: Negative for dizziness, weakness and numbness.   Psychiatric/Behavioral: Negative for agitation, behavioral problems and sleep disturbance  Vitals:    11/11/19 0927   BP: 146/70   Pulse: 78   Resp: 20   Temp: 98.3  F (36.8  C)   SpO2: 92%   Weight: 141 lb 9.6 oz (64.2 kg)       Physical Exam  Constitutional:       Appearance: He is well-developed.      Comments: Pleasant gentleman in no acute distress   HENT:      Head: Normocephalic.   Eyes:      Conjunctiva/sclera: Conjunctivae normal.   Neck:      Musculoskeletal: Normal range of motion.   Cardiovascular:       Rate and Rhythm: Normal rate and regular rhythm.      Heart sounds: Normal heart sounds. No murmur.   Pulmonary:      Effort: No respiratory distress.      Breath sounds: Normal breath sounds. No wheezing or rales.   Abdominal:      General: Bowel sounds are normal. There is no distension.      Palpations: Abdomen is soft.      Tenderness: There is no tenderness.   Musculoskeletal: Normal range of motion.   Skin:     General: Skin is warm.   Neurological:      Mental Status: He is alert and oriented to person, place, and time.   Psychiatric:         Behavior: Behavior normal.   MEDICATION LIST:  Current Outpatient Medications   Medication Sig     acetaminophen (TYLENOL) 500 MG tablet Take 1,000 mg by mouth 3 (three) times a day as needed for pain.     aspirin 81 MG EC tablet Take 81 mg by mouth every evening.            docoshexanoic acid-eicosapent 500 mg (FISH OIL) 500-100 mg cap capsule Take 500 mg by mouth daily.     enzalutamide (XTANDI) 40 mg cap Take 1 tablet by mouth daily.     leuprolide (LUPRON) 30 mg injection Inject 30 mg into the shoulder, thigh, or buttocks every 6 (six) months.      multivitamin therapeutic tablet Take 1 tablet by mouth daily.       DISCHARGE DIAGNOSIS:    ICD-10-CM    1. Weakness R53.1    2. Failure to thrive in adult R62.7    3. Late onset Alzheimer's disease without behavioral disturbance (H) G30.1     F02.80    4. Dyslipidemia E78.5        MEDICAL EQUIPMENT NEEDS:  Wheelchair ordered    DISCHARGE PLAN/FACE TO FACE:  I certify that services are/were furnished while this patient was under the care of a physician and that a physician or an allowed non-physician practitioner (NPP), had a face-to-face encounter that meets the physician face-to-face encounter requirements. The encounter was in whole, or in part, related to the primary reason for home health. The patient is confined to his/her home and needs intermittent skilled nursing, physical therapy, speech-language pathology, or  the continued need for occupational therapy. A plan of care has been established by a physician and is periodically reviewed by a physician.  Date of Face-to-Face Encounter: 11/11/19    I certify that, based on my findings, the following services are medically necessary home health services: GSS/PT/OT/HHA  And RN     My clinical findings support the need for the above skilled services because: PT OT for continued strength and endurance, home health aide to assist with activities of daily living, and RN for vital signs and medication management.    This patient is homebound because: He is deconditioned and easily fatigued following recent hospitalization with failure to thrive and poor oral intake.  He is also oriented to self only and is with cognitive impairment making him unsafe to leave the home or facility unassisted.    The patient is, or has been, under my care and I have initiated the establishment of the plan of care. This patient will be followed by a physician who will periodically review the plan of care.    Schedule follow up visit with primary care provider within 7 days to reestablish care.    Electronically signed by: Elissa Diaz CNP       Electronically signed by: Yaharia Carmen MD

## 2021-06-04 VITALS
HEART RATE: 70 BPM | RESPIRATION RATE: 16 BRPM | TEMPERATURE: 98.3 F | SYSTOLIC BLOOD PRESSURE: 142 MMHG | BODY MASS INDEX: 22.26 KG/M2 | WEIGHT: 142.1 LBS | OXYGEN SATURATION: 97 % | DIASTOLIC BLOOD PRESSURE: 70 MMHG

## 2021-06-04 VITALS
RESPIRATION RATE: 20 BRPM | SYSTOLIC BLOOD PRESSURE: 126 MMHG | HEART RATE: 80 BPM | DIASTOLIC BLOOD PRESSURE: 70 MMHG | BODY MASS INDEX: 22.4 KG/M2 | TEMPERATURE: 98 F | WEIGHT: 143 LBS | OXYGEN SATURATION: 96 %

## 2021-06-04 VITALS
WEIGHT: 145.6 LBS | DIASTOLIC BLOOD PRESSURE: 66 MMHG | BODY MASS INDEX: 22.8 KG/M2 | SYSTOLIC BLOOD PRESSURE: 110 MMHG | HEART RATE: 82 BPM | OXYGEN SATURATION: 95 % | TEMPERATURE: 98.1 F | RESPIRATION RATE: 20 BRPM

## 2021-06-04 VITALS
BODY MASS INDEX: 22.73 KG/M2 | RESPIRATION RATE: 20 BRPM | HEART RATE: 84 BPM | WEIGHT: 145.1 LBS | OXYGEN SATURATION: 96 % | SYSTOLIC BLOOD PRESSURE: 138 MMHG | TEMPERATURE: 96.8 F | DIASTOLIC BLOOD PRESSURE: 76 MMHG

## 2021-06-04 VITALS
TEMPERATURE: 98.7 F | DIASTOLIC BLOOD PRESSURE: 71 MMHG | SYSTOLIC BLOOD PRESSURE: 125 MMHG | HEART RATE: 78 BPM | WEIGHT: 146 LBS | BODY MASS INDEX: 22.87 KG/M2 | RESPIRATION RATE: 16 BRPM | OXYGEN SATURATION: 98 %

## 2021-06-06 NOTE — PROGRESS NOTES
[facility]    Facility:   [facility] [[id number]]   Code Status: DNR      CHIEF COMPLAINT/REASON FOR VISIT:  Chief Complaint   Patient presents with     Review Of Multiple Medical Conditions       HISTORY:      HPI: Federico is a 89 y.o. male undergoing physical and occupational therapy at [facility] transitional care unit.  He is with past medical history significant for hypertension, prostate cancer, dementia,  renal insufficiency stage 3, who presented to the ER after sustaining a fall a few days prior at his care facility. He was brought to the ER on 2/18 for reports of leg weakness. Of note he is essentially wheelchair bound. He was found to have a UTI and placed on rocephin and will complete a course of Ceftin on  2/28/20.     Today he is seen for a routine medical visit and for reports of current buttock cream ineffective, He continues to have excoriation and was changed to his  Previous home cream.    His fifth  toe is healed      He denied chest pain or shortness of breath, denied constipation or diarrhea, he denied any pain or burning. He completed  Ceftin on  2/28/2020 for UTI.  He is alert and oriented to self only.  Vital signs are stable.  He is mostly wheelchair-bound he does continue to have left leg weakness.      Past Medical History:   Diagnosis Date     Cholelithiases     pt. unaware     CTS (carpal tunnel syndrome)     bilateral     HTN (hypertension)      IGT (impaired glucose tolerance)      Loss of hearing     bilateral hearing aids     Prostate cancer (H)      Renal insufficiency      Renal insufficiency              Family History   Problem Relation Age of Onset     Cancer Father      Heart disease Father      Heart disease Sister      Stroke Brother      Heart disease Sister      Social History     Socioeconomic History     Marital status:      Spouse name: Not on file     Number of children: Not on file     Years of  education: Not on file     Highest education level: Not on file   Occupational History     Not on file   Social Needs     Financial resource strain: Not on file     Food insecurity     Worry: Not on file     Inability: Not on file     Transportation needs     Medical: Not on file     Non-medical: Not on file   Tobacco Use     Smoking status: Never Smoker     Smokeless tobacco: Never Used   Substance and Sexual Activity     Alcohol use: Yes     Comment: once a month or less     Drug use: No     Sexual activity: Not on file   Lifestyle     Physical activity     Days per week: Not on file     Minutes per session: Not on file     Stress: Not on file   Relationships     Social connections     Talks on phone: Not on file     Gets together: Not on file     Attends [employer] service: Not on file     Active member of club or organization: Not on file     Attends meetings of clubs or organizations: Not on file     Relationship status: Not on file     Intimate partner violence     Fear of current or ex partner: Not on file     Emotionally abused: Not on file     Physically abused: Not on file     Forced sexual activity: Not on file   Other Topics Concern     Not on file   Social History Narrative     Not on file     Limited due to cognition chart also reviewed and spoke with nursing    Review of Systems   Constitutional: Positive for fatigue. Negative for activity change, appetite change, chills and fever.   HENT: Negative for congestion and sore throat.    Eyes: Negative for visual disturbance.   Respiratory: Negative for cough, shortness of breath and wheezing.    Cardiovascular: Negative for chest pain and leg swelling.   Gastrointestinal: Negative for abdominal distention, abdominal pain, constipation, diarrhea and nausea.   Genitourinary: Negative for dysuria.        Completed treatment  For a  UTI   Musculoskeletal: Negative for arthralgias and myalgias.   Skin: Negative for color change, rash and wound.   Neurological:  Negative for dizziness, weakness and numbness.   Psychiatric/Behavioral: Negative for agitation, behavioral problems and sleep disturbance.       Vitals:    03/17/20 0951   BP: 126/70   Pulse: 80   Resp: 20   Temp: 98  F (36.7  C)   SpO2: 96%   Weight: 143 lb (64.9 kg)       Physical Exam  Constitutional:       Appearance: He is well-developed.      Comments: Pleasantly confused gentleman in no acute distress   HENT:      Head: Normocephalic.   Eyes:      Conjunctiva/sclera: Conjunctivae normal.   Neck:      Musculoskeletal: Normal range of motion.   Cardiovascular:      Rate and Rhythm: Normal rate and regular rhythm.      Heart sounds: Normal heart sounds. No murmur.   Pulmonary:      Effort: No respiratory distress.      Breath sounds: Normal breath sounds. No wheezing or rales.   Abdominal:      General: Bowel sounds are normal. There is no distension.      Palpations: Abdomen is soft.      Tenderness: There is no abdominal tenderness.   Musculoskeletal: Normal range of motion.   Skin:     General: Skin is warm.      Comments: Excoriated bottom.    Neurological:      Mental Status: He is alert and oriented to person, place, and time.   Psychiatric:         Behavior: Behavior normal.           LABS:   No results found for this or any previous visit (from the past 240 hour(s)).  Current Outpatient Medications   Medication Sig Note     acetaminophen (TYLENOL) 500 MG tablet Take 1,000 mg by mouth 3 (three) times a day as needed for pain.      aspirin 81 MG EC tablet Take 81 mg by mouth every evening.             dimethicone/zinc oxide (GEOVANNI PROTECT TOP) Apply 1 application topically. Apple to affected area daily skin lesion      docoshexanoic acid-eicosapent 500 mg (FISH OIL) 500-100 mg cap capsule Take 500 mg by mouth daily.      enzalutamide (XTANDI) 40 mg cap Take 1 tablet by mouth daily. 2/24/2020: On hold until seen by oncology      leuprolide (LUPRON) 30 mg injection Inject 30 mg into the shoulder, thigh, or  buttocks every 6 (six) months.  2/24/2020: On hold in the TCU- due next 3/20/20     multivitamin therapeutic tablet Take 1 tablet by mouth daily.      ASSESSMENT:      ICD-10-CM    1. Weakness  R53.1    2. Failure to thrive in adult  R62.7    3. Late onset Alzheimer's disease without behavioral disturbance (H)  G30.1     F02.80    4. Pleural effusion  J90        PLAN:      Left fifth toe nail damaged healed     Weakness PT OT    Malnutrition dietary to follow    Prostate cancer Xtandi and leuprolide currently on hold in the transitional care unit patient to follow-up with urology    UTI completed  Ceftin  on 2/28/2020    Buttock Excoriation- resume prior home cream    Electronically signed by: Elissa Diaz CNP

## 2021-06-06 NOTE — PROGRESS NOTES
[facility]    Facility:   [facility] [[id number]]   Code Status: DNR      CHIEF COMPLAINT/REASON FOR VISIT:  Chief Complaint   Patient presents with     Review Of Multiple Medical Conditions       HISTORY:      HPI: Federico is a 89 y.o. male undergoing physical and occupational therapy at [facility] transitional care unit.  He is with past medical history significant for hypertension, prostate cancer, dementia,  renal insufficiency stage 3, who presented to the ER after sustaining a fall a few days prior at his care facility. He was brought to the ER on 2/18 for reports of leg weakness. Of note he is essentially wheelchair bound. He was found to have a UTI and placed on rocephin and will complete a course of Ceftin on  2/28/20.     Today he is seen for first routine visit to review multiple medical issues.  He denied chest pain or shortness of breath, denied constipation or diarrhea, he denied any pain or burning however he is being treated with Ceftin until 2/28/2020 for UTI.  He was alert and oriented to self only.  Vital signs are stable.    Past Medical History:   Diagnosis Date     Cholelithiases     pt. unaware     CTS (carpal tunnel syndrome)     bilateral     HTN (hypertension)      IGT (impaired glucose tolerance)      Loss of hearing     bilateral hearing aids     Prostate cancer (H)      Renal insufficiency      Renal insufficiency              Family History   Problem Relation Age of Onset     Cancer Father      Heart disease Father      Heart disease Sister      Stroke Brother      Heart disease Sister      Social History     Socioeconomic History     Marital status:      Spouse name: Not on file     Number of children: Not on file     Years of education: Not on file     Highest education level: Not on file   Occupational History     Not on file   Social Needs     Financial resource strain: Not on file     Food insecurity:     Worry:  Not on file     Inability: Not on file     Transportation needs:     Medical: Not on file     Non-medical: Not on file   Tobacco Use     Smoking status: Never Smoker     Smokeless tobacco: Never Used   Substance and Sexual Activity     Alcohol use: Yes     Comment: once a month or less     Drug use: No     Sexual activity: Not on file   Lifestyle     Physical activity:     Days per week: Not on file     Minutes per session: Not on file     Stress: Not on file   Relationships     Social connections:     Talks on phone: Not on file     Gets together: Not on file     Attends [employer] service: Not on file     Active member of club or organization: Not on file     Attends meetings of clubs or organizations: Not on file     Relationship status: Not on file     Intimate partner violence:     Fear of current or ex partner: Not on file     Emotionally abused: Not on file     Physically abused: Not on file     Forced sexual activity: Not on file   Other Topics Concern     Not on file   Social History Narrative     Not on file     Limited due to cognition chart also reviewed and spoke with nursing    Review of Systems   Constitutional: Positive for fatigue. Negative for activity change, appetite change, chills and fever.   HENT: Negative for congestion and sore throat.    Eyes: Negative for visual disturbance.   Respiratory: Negative for cough, shortness of breath and wheezing.    Cardiovascular: Negative for chest pain and leg swelling.   Gastrointestinal: Negative for abdominal distention, abdominal pain, constipation, diarrhea and nausea.   Genitourinary: Negative for dysuria.        Currently being treated for UTI   Musculoskeletal: Negative for arthralgias and myalgias.   Skin: Negative for color change, rash and wound.   Neurological: Negative for dizziness, weakness and numbness.   Psychiatric/Behavioral: Negative for agitation, behavioral problems and sleep disturbance.       Vitals:    02/24/20 0858   BP: 124/62    Pulse: 80   Resp: 20   Temp: 98.4  F (36.9  C)   SpO2: 96%       Physical Exam  Constitutional:       Appearance: He is well-developed.      Comments: Pleasantly confused gentleman in no acute distress   HENT:      Head: Normocephalic.   Eyes:      Conjunctiva/sclera: Conjunctivae normal.   Neck:      Musculoskeletal: Normal range of motion.   Cardiovascular:      Rate and Rhythm: Normal rate and regular rhythm.      Heart sounds: Normal heart sounds. No murmur.   Pulmonary:      Effort: No respiratory distress.      Breath sounds: Normal breath sounds. No wheezing or rales.   Abdominal:      General: Bowel sounds are normal. There is no distension.      Palpations: Abdomen is soft.      Tenderness: There is no abdominal tenderness.   Musculoskeletal: Normal range of motion.   Skin:     General: Skin is warm.   Neurological:      Mental Status: He is alert and oriented to person, place, and time.   Psychiatric:         Behavior: Behavior normal.           LABS:   No results found for this or any previous visit (from the past 240 hour(s)).  Current Outpatient Medications   Medication Sig Note     acetaminophen (TYLENOL) 500 MG tablet Take 1,000 mg by mouth 3 (three) times a day as needed for pain.      aspirin 81 MG EC tablet Take 81 mg by mouth every evening.             cefuroxime (CEFTIN) 500 MG tablet Take 500 mg by mouth 2 (two) times a day.      dimethicone/zinc oxide (GEOVANNI PROTECT TOP) Apply 1 application topically. Apple to affected area daily skin lesion      docoshexanoic acid-eicosapent 500 mg (FISH OIL) 500-100 mg cap capsule Take 500 mg by mouth daily.      multivitamin therapeutic tablet Take 1 tablet by mouth daily.      enzalutamide (XTANDI) 40 mg cap Take 1 tablet by mouth daily. 2/24/2020: On hold until seen by oncology      leuprolide (LUPRON) 30 mg injection Inject 30 mg into the shoulder, thigh, or buttocks every 6 (six) months.  2/24/2020: On hold in the TCU- due next 3/20/20     ASSESSMENT:       ICD-10-CM    1. Weakness R53.1    2. Moderate protein-calorie malnutrition (H) E44.0    3. Prostate cancer (H) C61    4. Urinary tract infection without hematuria, site unspecified N39.0        PLAN:    Weakness PT OT    Malnutrition dietary to follow    Prostate cancer Xtandi and leuprolide currently on hold in the transitional care unit patient to follow-up with urology    UTI currently on Ceftin which will end on 2/28/2020    Electronically signed by: Elissa Diaz CNP

## 2021-06-06 NOTE — TELEPHONE ENCOUNTER
[facility] Nurse Triage Telephone Note      Provider: ALONSO Young  Facility: [facility] Type: TCU    Caller: Valeria  Call Back Number:  [phone]    Allergies: Other food allergy    Reason for call: Nurse reporting that patient is a new admit to TCU today.  He came with orders for Xtandi for prostate cancer.  Family states that he shouldn't be on this med or have it filled unless consulted with oncology first.       Verbal Order/Direction given by Provider: Hold Xtandi until addressed by oncology.      Provider giving order: ALONSO Young    Verbal order given to: Valeria Wolfe RN

## 2021-06-06 NOTE — PROGRESS NOTES
[facility]    Facility:   [facility] [[id number]]   Code Status: DNR      CHIEF COMPLAINT/REASON FOR VISIT:  Chief Complaint   Patient presents with     Review Of Multiple Medical Conditions       HISTORY:      HPI: Federico is a 89 y.o. male undergoing physical and occupational therapy at [facility] transitional care unit.  He is with past medical history significant for hypertension, prostate cancer, dementia,  renal insufficiency stage 3, who presented to the ER after sustaining a fall a few days prior at his care facility. He was brought to the ER on 2/18 for reports of leg weakness. Of note he is essentially wheelchair bound. He was found to have a UTI and placed on rocephin and will complete a course of Ceftin on  2/28/20.     Today he is seen for a routine medical visit to review multiple medical issues.  His left  fifth toe is healed and foot soaks were stopped.     He denied chest pain or shortness of breath, denied constipation or diarrhea, he denied any pain or burning. He completed  Ceftin on  2/28/2020 for UTI.  He was alert and oriented to self only.  Vital signs are stable.  He is mostly wheelchair-bound he does continue to have left leg weakness.  His weights have been stable over the last week     Past Medical History:   Diagnosis Date     Cholelithiases     pt. unaware     CTS (carpal tunnel syndrome)     bilateral     HTN (hypertension)      IGT (impaired glucose tolerance)      Loss of hearing     bilateral hearing aids     Prostate cancer (H)      Renal insufficiency      Renal insufficiency              Family History   Problem Relation Age of Onset     Cancer Father      Heart disease Father      Heart disease Sister      Stroke Brother      Heart disease Sister      Social History     Socioeconomic History     Marital status:      Spouse name: Not on file     Number of children: Not on file     Years of education: Not on  file     Highest education level: Not on file   Occupational History     Not on file   Social Needs     Financial resource strain: Not on file     Food insecurity     Worry: Not on file     Inability: Not on file     Transportation needs     Medical: Not on file     Non-medical: Not on file   Tobacco Use     Smoking status: Never Smoker     Smokeless tobacco: Never Used   Substance and Sexual Activity     Alcohol use: Yes     Comment: once a month or less     Drug use: No     Sexual activity: Not on file   Lifestyle     Physical activity     Days per week: Not on file     Minutes per session: Not on file     Stress: Not on file   Relationships     Social connections     Talks on phone: Not on file     Gets together: Not on file     Attends Confucianist service: Not on file     Active member of club or organization: Not on file     Attends meetings of clubs or organizations: Not on file     Relationship status: Not on file     Intimate partner violence     Fear of current or ex partner: Not on file     Emotionally abused: Not on file     Physically abused: Not on file     Forced sexual activity: Not on file   Other Topics Concern     Not on file   Social History Narrative     Not on file     Limited due to cognition chart also reviewed and spoke with nursing    Review of Systems   Constitutional: Positive for fatigue. Negative for activity change, appetite change, chills and fever.   HENT: Negative for congestion and sore throat.    Eyes: Negative for visual disturbance.   Respiratory: Negative for cough, shortness of breath and wheezing.    Cardiovascular: Negative for chest pain and leg swelling.   Gastrointestinal: Negative for abdominal distention, abdominal pain, constipation, diarrhea and nausea.   Genitourinary: Negative for dysuria.        Completed treatment  For a  UTI   Musculoskeletal: Negative for arthralgias and myalgias.   Skin: Negative for color change, rash and wound.   Neurological: Negative for  dizziness, weakness and numbness.   Psychiatric/Behavioral: Negative for agitation, behavioral problems and sleep disturbance.       Vitals:    03/10/20 0824   BP: 110/66   Pulse: 82   Resp: 20   Temp: 98.1  F (36.7  C)   SpO2: 95%   Weight: 145 lb 9.6 oz (66 kg)       Physical Exam  Constitutional:       Appearance: He is well-developed.      Comments: Pleasantly confused gentleman in no acute distress   HENT:      Head: Normocephalic.   Eyes:      Conjunctiva/sclera: Conjunctivae normal.   Neck:      Musculoskeletal: Normal range of motion.   Cardiovascular:      Rate and Rhythm: Normal rate and regular rhythm.      Heart sounds: Normal heart sounds. No murmur.   Pulmonary:      Effort: No respiratory distress.      Breath sounds: Normal breath sounds. No wheezing or rales.   Abdominal:      General: Bowel sounds are normal. There is no distension.      Palpations: Abdomen is soft.      Tenderness: There is no abdominal tenderness.   Musculoskeletal: Normal range of motion.   Skin:     General: Skin is warm.   Neurological:      Mental Status: He is alert and oriented to person, place, and time.   Psychiatric:         Behavior: Behavior normal.           LABS:   No results found for this or any previous visit (from the past 240 hour(s)).  Current Outpatient Medications   Medication Sig Note     acetaminophen (TYLENOL) 500 MG tablet Take 1,000 mg by mouth 3 (three) times a day as needed for pain.      aspirin 81 MG EC tablet Take 81 mg by mouth every evening.             dimethicone/zinc oxide (GEOVANNI PROTECT TOP) Apply 1 application topically. Apple to affected area daily skin lesion      docoshexanoic acid-eicosapent 500 mg (FISH OIL) 500-100 mg cap capsule Take 500 mg by mouth daily.      enzalutamide (XTANDI) 40 mg cap Take 1 tablet by mouth daily. 2/24/2020: On hold until seen by oncology      leuprolide (LUPRON) 30 mg injection Inject 30 mg into the shoulder, thigh, or buttocks every 6 (six) months.  2/24/2020:  On hold in the TCU- due next 3/20/20     multivitamin therapeutic tablet Take 1 tablet by mouth daily.      ASSESSMENT:      ICD-10-CM    1. Weakness  R53.1    2. Failure to thrive in adult  R62.7    3. Urinary tract infection without hematuria, site unspecified  N39.0        PLAN:      Left fifth toe nail damaged currently healed him foot soaks were stopped    Weakness PT OT    Malnutrition dietary to follow    Prostate cancer Xtandi and leuprolide currently on hold in the transitional care unit patient to follow-up with urology    UTI completed  Ceftin  on 2/28/2020    Electronically signed by: Elissa Diaz CNP

## 2021-06-06 NOTE — PROGRESS NOTES
[facility]    Facility:   [facility] [[id number]]   Code Status: DNR      CHIEF COMPLAINT/REASON FOR VISIT:  Chief Complaint   Patient presents with     Review Of Multiple Medical Conditions       HISTORY:      HPI: Federico is a 89 y.o. male undergoing physical and occupational therapy at [facility] transitional care unit.  He is with past medical history significant for hypertension, prostate cancer, dementia,  renal insufficiency stage 3, who presented to the ER after sustaining a fall a few days prior at his care facility. He was brought to the ER on 2/18 for reports of leg weakness. Of note he is essentially wheelchair bound. He was found to have a UTI and placed on rocephin and will complete a course of Ceftin on  2/28/20.     Today he is seen for reports of a Sophie Anita nail on his fifth left toe.  It appears he bumped his foot and his toenail was hanging off.  It was trimmed by staff.  Foot soaks and bacitracin were ordered.  He denied chest pain or shortness of breath, denied constipation or diarrhea, he denied any pain or burning however he is being treated with Ceftin until 2/28/2020 for UTI.  He was alert and oriented to self only.  Vital signs are stable.    Past Medical History:   Diagnosis Date     Cholelithiases     pt. unaware     CTS (carpal tunnel syndrome)     bilateral     HTN (hypertension)      IGT (impaired glucose tolerance)      Loss of hearing     bilateral hearing aids     Prostate cancer (H)      Renal insufficiency      Renal insufficiency              Family History   Problem Relation Age of Onset     Cancer Father      Heart disease Father      Heart disease Sister      Stroke Brother      Heart disease Sister      Social History     Socioeconomic History     Marital status:      Spouse name: Not on file     Number of children: Not on file     Years of education: Not on file     Highest education level: Not on file    Occupational History     Not on file   Social Needs     Financial resource strain: Not on file     Food insecurity:     Worry: Not on file     Inability: Not on file     Transportation needs:     Medical: Not on file     Non-medical: Not on file   Tobacco Use     Smoking status: Never Smoker     Smokeless tobacco: Never Used   Substance and Sexual Activity     Alcohol use: Yes     Comment: once a month or less     Drug use: No     Sexual activity: Not on file   Lifestyle     Physical activity:     Days per week: Not on file     Minutes per session: Not on file     Stress: Not on file   Relationships     Social connections:     Talks on phone: Not on file     Gets together: Not on file     Attends Uatsdin service: Not on file     Active member of club or organization: Not on file     Attends meetings of clubs or organizations: Not on file     Relationship status: Not on file     Intimate partner violence:     Fear of current or ex partner: Not on file     Emotionally abused: Not on file     Physically abused: Not on file     Forced sexual activity: Not on file   Other Topics Concern     Not on file   Social History Narrative     Not on file     Limited due to cognition chart also reviewed and spoke with nursing    Review of Systems   Constitutional: Positive for fatigue. Negative for activity change, appetite change, chills and fever.   HENT: Negative for congestion and sore throat.    Eyes: Negative for visual disturbance.   Respiratory: Negative for cough, shortness of breath and wheezing.    Cardiovascular: Negative for chest pain and leg swelling.   Gastrointestinal: Negative for abdominal distention, abdominal pain, constipation, diarrhea and nausea.   Genitourinary: Negative for dysuria.        Currently being treated for UTI   Musculoskeletal: Negative for arthralgias and myalgias.   Skin: Negative for color change, rash and wound.   Neurological: Negative for dizziness, weakness and numbness.    Psychiatric/Behavioral: Negative for agitation, behavioral problems and sleep disturbance.       Vitals:    02/27/20 0840   BP: 125/71   Pulse: 78   Resp: 16   Temp: 98.7  F (37.1  C)   SpO2: 98%   Weight: 146 lb (66.2 kg)       Physical Exam  Constitutional:       Appearance: He is well-developed.      Comments: Pleasantly confused gentleman in no acute distress   HENT:      Head: Normocephalic.   Eyes:      Conjunctiva/sclera: Conjunctivae normal.   Neck:      Musculoskeletal: Normal range of motion.   Cardiovascular:      Rate and Rhythm: Normal rate and regular rhythm.      Heart sounds: Normal heart sounds. No murmur.   Pulmonary:      Effort: No respiratory distress.      Breath sounds: Normal breath sounds. No wheezing or rales.   Abdominal:      General: Bowel sounds are normal. There is no distension.      Palpations: Abdomen is soft.      Tenderness: There is no abdominal tenderness.   Musculoskeletal: Normal range of motion.   Skin:     General: Skin is warm.   Neurological:      Mental Status: He is alert and oriented to person, place, and time.   Psychiatric:         Behavior: Behavior normal.           LABS:   No results found for this or any previous visit (from the past 240 hour(s)).  Current Outpatient Medications   Medication Sig Note     acetaminophen (TYLENOL) 500 MG tablet Take 1,000 mg by mouth 3 (three) times a day as needed for pain.      aspirin 81 MG EC tablet Take 81 mg by mouth every evening.             cefuroxime (CEFTIN) 500 MG tablet Take 500 mg by mouth 2 (two) times a day.      dimethicone/zinc oxide (GEOVANNI PROTECT TOP) Apply 1 application topically. Apple to affected area daily skin lesion      docoshexanoic acid-eicosapent 500 mg (FISH OIL) 500-100 mg cap capsule Take 500 mg by mouth daily.      enzalutamide (XTANDI) 40 mg cap Take 1 tablet by mouth daily. 2/24/2020: On hold until seen by oncology      leuprolide (LUPRON) 30 mg injection Inject 30 mg into the shoulder, thigh, or  buttocks every 6 (six) months.  2/24/2020: On hold in the TCU- due next 3/20/20     multivitamin therapeutic tablet Take 1 tablet by mouth daily.      ASSESSMENT:      ICD-10-CM    1. Failure to thrive in adult R62.7    2. Late onset Alzheimer's disease without behavioral disturbance (H) G30.1     F02.80        PLAN:      Left fifth toe nail damaged trimmed per staff, twice daily foot soaks with Epson salt and bacitracin until healed    Weakness PT OT    Malnutrition dietary to follow    Prostate cancer Xtandi and leuprolide currently on hold in the transitional care unit patient to follow-up with urology    UTI currently on Ceftin which will end on 2/28/2020    Electronically signed by: Elissa Diaz CNP

## 2021-06-06 NOTE — PROGRESS NOTES
[facility]    Facility:   [facility] [[id number]]   Code Status: DNR      CHIEF COMPLAINT/REASON FOR VISIT:  Chief Complaint   Patient presents with     Review Of Multiple Medical Conditions       HISTORY:      HPI: Federico is a 89 y.o. male undergoing physical and occupational therapy at [facility] transitional care unit.  He is with past medical history significant for hypertension, prostate cancer, dementia,  renal insufficiency stage 3, who presented to the ER after sustaining a fall a few days prior at his care facility. He was brought to the ER on 2/18 for reports of leg weakness. Of note he is essentially wheelchair bound. He was found to have a UTI and placed on rocephin and will complete a course of Ceftin on  2/28/20.     Today he is seen for a routine medical visit to review multiple medical issues.  His left  fifth toe  is healing well and was without S/S Infection. Currently he is getting foot soaks and bacitracin.      He denied chest pain or shortness of breath, denied constipation or diarrhea, he denied any pain or burning. He completed  Ceftin on  2/28/2020 for UTI.  He was alert and oriented to self only.  Vital signs are stable. His stool, is beginning to form     Past Medical History:   Diagnosis Date     Cholelithiases     pt. unaware     CTS (carpal tunnel syndrome)     bilateral     HTN (hypertension)      IGT (impaired glucose tolerance)      Loss of hearing     bilateral hearing aids     Prostate cancer (H)      Renal insufficiency      Renal insufficiency              Family History   Problem Relation Age of Onset     Cancer Father      Heart disease Father      Heart disease Sister      Stroke Brother      Heart disease Sister      Social History     Socioeconomic History     Marital status:      Spouse name: Not on file     Number of children: Not on file     Years of education: Not on file     Highest education level:  Not on file   Occupational History     Not on file   Social Needs     Financial resource strain: Not on file     Food insecurity:     Worry: Not on file     Inability: Not on file     Transportation needs:     Medical: Not on file     Non-medical: Not on file   Tobacco Use     Smoking status: Never Smoker     Smokeless tobacco: Never Used   Substance and Sexual Activity     Alcohol use: Yes     Comment: once a month or less     Drug use: No     Sexual activity: Not on file   Lifestyle     Physical activity:     Days per week: Not on file     Minutes per session: Not on file     Stress: Not on file   Relationships     Social connections:     Talks on phone: Not on file     Gets together: Not on file     Attends Shinto service: Not on file     Active member of club or organization: Not on file     Attends meetings of clubs or organizations: Not on file     Relationship status: Not on file     Intimate partner violence:     Fear of current or ex partner: Not on file     Emotionally abused: Not on file     Physically abused: Not on file     Forced sexual activity: Not on file   Other Topics Concern     Not on file   Social History Narrative     Not on file     Limited due to cognition chart also reviewed and spoke with nursing    Review of Systems   Constitutional: Positive for fatigue. Negative for activity change, appetite change, chills and fever.   HENT: Negative for congestion and sore throat.    Eyes: Negative for visual disturbance.   Respiratory: Negative for cough, shortness of breath and wheezing.    Cardiovascular: Negative for chest pain and leg swelling.   Gastrointestinal: Negative for abdominal distention, abdominal pain, constipation, diarrhea and nausea.   Genitourinary: Negative for dysuria.        Completed treatment  For a  UTI   Musculoskeletal: Negative for arthralgias and myalgias.   Skin: Negative for color change, rash and wound.   Neurological: Negative for dizziness, weakness and numbness.    Psychiatric/Behavioral: Negative for agitation, behavioral problems and sleep disturbance.       Vitals:    03/04/20 0741   BP: 138/76   Pulse: 84   Resp: 20   Temp: 96.8  F (36  C)   SpO2: 96%   Weight: 145 lb 1.6 oz (65.8 kg)       Physical Exam  Constitutional:       Appearance: He is well-developed.      Comments: Pleasantly confused gentleman in no acute distress   HENT:      Head: Normocephalic.   Eyes:      Conjunctiva/sclera: Conjunctivae normal.   Neck:      Musculoskeletal: Normal range of motion.   Cardiovascular:      Rate and Rhythm: Normal rate and regular rhythm.      Heart sounds: Normal heart sounds. No murmur.   Pulmonary:      Effort: No respiratory distress.      Breath sounds: Normal breath sounds. No wheezing or rales.   Abdominal:      General: Bowel sounds are normal. There is no distension.      Palpations: Abdomen is soft.      Tenderness: There is no abdominal tenderness.   Musculoskeletal: Normal range of motion.   Skin:     General: Skin is warm.   Neurological:      Mental Status: He is alert and oriented to person, place, and time.   Psychiatric:         Behavior: Behavior normal.           LABS:   No results found for this or any previous visit (from the past 240 hour(s)).  Current Outpatient Medications   Medication Sig Note     acetaminophen (TYLENOL) 500 MG tablet Take 1,000 mg by mouth 3 (three) times a day as needed for pain.      aspirin 81 MG EC tablet Take 81 mg by mouth every evening.             dimethicone/zinc oxide (GEOVANNI PROTECT TOP) Apply 1 application topically. Apple to affected area daily skin lesion      docoshexanoic acid-eicosapent 500 mg (FISH OIL) 500-100 mg cap capsule Take 500 mg by mouth daily.      enzalutamide (XTANDI) 40 mg cap Take 1 tablet by mouth daily. 2/24/2020: On hold until seen by oncology      leuprolide (LUPRON) 30 mg injection Inject 30 mg into the shoulder, thigh, or buttocks every 6 (six) months.  2/24/2020: On hold in the TCU- due next  3/20/20     multivitamin therapeutic tablet Take 1 tablet by mouth daily.      ASSESSMENT:      ICD-10-CM    1. Essential hypertension I10    2. Failure to thrive in adult R62.7    3. Weakness R53.1        PLAN:      Left fifth toe nail damaged trimmed per staff, twice daily foot soaks with Epson salt and bacitracin until healed    Weakness PT OT    Malnutrition dietary to follow    Prostate cancer Xtandi and leuprolide currently on hold in the transitional care unit patient to follow-up with urology    UTI completed  Ceftin  on 2/28/2020    Electronically signed by: Elissa Diaz CNP

## 2021-06-06 NOTE — PROGRESS NOTES
[facility]      Facility:    [facility] [[id number]]  Code Status: DNR       Chief Complaint/Reason for Visit:  Chief Complaint   Patient presents with     H & P     Admit note to TCU for UTI.       HPI:   Federico is a 89 y.o. male with hx of prostate cancer, HTN, dementia, presented to the hospital on 2/18/20 with info as noted below.     Hospital Course:   Federico Epps is a 89 y.o. male with a history of HYPERTENSION, impaired glucose tolerance, surgery for prostate cancer, dementia and CHRONIC KIDNEY DISEASE stage 3. He is essentially wheelchair-bound and has what appears to be some chronic sores on his bottom. He had a fall at his care facility several days before this current admission. He was brought to our ER 2/18 because he complained of leg weakness. Once here his urine was shown to have over 100 WBC's and many bacteria. He was admitted for observation and placed on Rocephin. On 2/18 he was evaluated by PT, who felt he would benefit from some ongoing therapies after discharge. I will have him finish a course of Ceftin. His assisted living facility says his function was marginal before his UTI and they feel he should have some TCU rehabilitation before he attempts to return to them.    Medication regimen changes: the only medication change was the addition of Ceftin for a UTI.     Overall stabilized and discharged to TCU on 2/21/20 for PT, OT, nursing cares, medical management and monitoring.     Today:  Unable to do accurate ROS due to dementia. He denies any pain or complaints. Nursing reports no concerns. No hypoxia or need for oxygen. No fever or cough noted. Appetite is fair. No vomiting, diarrhea or constipation. No complaints of abdominal pain. Unaware of any urinary problems. He has completed Ceftin PO after IV abx in the hospital. He denies shortness of breath or chest pain. He is [place], no reported new vision problems.       Past Medical  History:  Past Medical History:   Diagnosis Date     Cholelithiases     pt. unaware     CTS (carpal tunnel syndrome)     bilateral     HTN (hypertension)      IGT (impaired glucose tolerance)      Loss of hearing     bilateral hearing aids     Prostate cancer (H)      Renal insufficiency      Renal insufficiency            Surgical History:  Past Surgical History:   Procedure Laterality Date     CATARACT EXTRACTION Bilateral      HERNIA REPAIR Left      TX REVISE MEDIAN N/CARPAL TUNNEL SURG Right 3/31/2015    Procedure: RIGHT CARPAL TUNNEL RELEASE;  Surgeon: Henry Mora MD;  Location: [facility];  Service: Orthopedics     PROSTATECTOMY  2010    cryoablation     TOTAL HIP ARTHROPLASTY Bilateral        Family History:   Family History   Problem Relation Age of Onset     Cancer Father      Heart disease Father      Heart disease Sister      Stroke Brother      Heart disease Sister        Social History:    Social History     Socioeconomic History     Marital status:      Spouse name: Not on file     Number of children: Not on file     Years of education: Not on file     Highest education level: Not on file   Occupational History     Not on file   Social Needs     Financial resource strain: Not on file     Food insecurity     Worry: Not on file     Inability: Not on file     Transportation needs     Medical: Not on file     Non-medical: Not on file   Tobacco Use     Smoking status: Never Smoker     Smokeless tobacco: Never Used   Substance and Sexual Activity     Alcohol use: Yes     Comment: once a month or less     Drug use: No     Sexual activity: Not on file   Lifestyle     Physical activity     Days per week: Not on file     Minutes per session: Not on file     Stress: Not on file   Relationships     Social connections     Talks on phone: Not on file     Gets together: Not on file     Attends Pentecostal service: Not on file     Active member of club or organization: Not on file     Attends meetings  of clubs or organizations: Not on file     Relationship status: Not on file     Intimate partner violence     Fear of current or ex partner: Not on file     Emotionally abused: Not on file     Physically abused: Not on file     Forced sexual activity: Not on file   Other Topics Concern     Not on file   Social History Narrative     Not on file       Medications:  Current Outpatient Medications   Medication Sig Note     acetaminophen (TYLENOL) 500 MG tablet Take 1,000 mg by mouth 3 (three) times a day as needed for pain.      aspirin 81 MG EC tablet Take 81 mg by mouth every evening.             dimethicone/zinc oxide (GEOVANNI PROTECT TOP) Apply 1 application topically. Apple to affected area daily skin lesion      docoshexanoic acid-eicosapent 500 mg (FISH OIL) 500-100 mg cap capsule Take 500 mg by mouth daily.      enzalutamide (XTANDI) 40 mg cap Take 1 tablet by mouth daily. 2/24/2020: On hold until seen by oncology      leuprolide (LUPRON) 30 mg injection Inject 30 mg into the shoulder, thigh, or buttocks every 6 (six) months.  2/24/2020: On hold in the TCU- due next 3/20/20     multivitamin therapeutic tablet Take 1 tablet by mouth daily.        Allergies:  Allergies   Allergen Reactions     Other Food Allergy Anaphylaxis     Peanuts caused edema / throat swelling / closing        Review of Systems:  Pertinent items are noted in HPI.      Physical Exam:   General: Patient is alert elderly male, no distress.   Vitals: /87, Temp 98.3, Pulse 84, RR 16, O2 sat 94%RA.  HEENT: Head is NCAT. Eyes show no injection or icterus. Nares negative. Oropharynx well hydrated.  Neck: Supple. No tenderness or adenopathy. No JVD.  Lungs: Clear bilaterally. No wheezes.  Cardiovascular: Regular rate and rhythm, normal S1. S2.  Back: No spinal or CVA tenderness.  Abdomen: Soft, no tenderness on exam. Bowel sounds present. No guarding rebound or rigidity.  : Deferred.  Extremities: No edema is noted.  Musculoskeletal: Age related  degen changes.   Skin: No rashes.   Psych: Difficult to assess.      Labs:  Results for orders placed or performed in visit on 11/04/19   Basic Metabolic Panel   Result Value Ref Range    Sodium 136 136 - 145 mmol/L    Potassium 5.0 3.5 - 5.0 mmol/L    Chloride 106 98 - 107 mmol/L    CO2 21 (L) 22 - 31 mmol/L    Anion Gap, Calculation 9 5 - 18 mmol/L    Glucose 91 70 - 125 mg/dL    Calcium 9.2 8.5 - 10.5 mg/dL    BUN 33 (H) 8 - 28 mg/dL    Creatinine 1.20 0.70 - 1.30 mg/dL    GFR MDRD Af Amer >60 >60 mL/min/1.73m2    GFR MDRD Non Af Amer 57 (L) >60 mL/min/1.73m2       Assessment/Plan:  1. Weakness and deconditioning. Here for therapy, strengthening. Baseline poor functional ability.  2. UTI. Completed abx. No current sx of concern.  3. HTN. Not on meds. BPs satisfactory. Continue to monitor.  4. Dementia. Confused at baseline. No family here at present. SW involved for disposition planning.  5. CKD. Stage 3. Check labs as warranted.  6. Hx prostate cancer. Meds on hold while in TCU.   7. Code status is DNR.            Electronically signed by: Yahaira Carmen MD

## 2021-06-07 NOTE — PROGRESS NOTES
[facility]    Facility:   [facility] [[id number]]   Code Status: DNR  PCP: Jaime Watkins MD   Phone: [phone]   Fax: [phone]      CHIEF COMPLAINT/REASON FOR VISIT:  Chief Complaint   Patient presents with     Discharge Summary     [facility] TCU 2/21/20-4/3/20.       HISTORY COURSE:    FROM TCU HPI:   Federico is a 89 y.o. male with hx of prostate cancer, HTN, dementia, presented to the hospital on 2/18/20 with info as noted below.     Hospital Course:   Federico Epps is a 89 y.o. male with a history of HYPERTENSION, impaired glucose tolerance, surgery for prostate cancer, dementia and CHRONIC KIDNEY DISEASE stage 3. He is essentially wheelchair-bound and has what appears to be some chronic sores on his bottom. He had a fall at his care facility several days before this current admission. He was brought to our ER 2/18 because he complained of leg weakness. Once here his urine was shown to have over 100 WBC's and many bacteria. He was admitted for observation and placed on Rocephin. On 2/18 he was evaluated by PT, who felt he would benefit from some ongoing therapies after discharge. I will have him finish a course of Ceftin. His assisted living facility says his function was marginal before his UTI and they feel he should have some TCU rehabilitation before he attempts to return to them. Medication regimen changes: the only medication change was the addition of Ceftin for a UTI. Overall stabilized and discharged to TCU on 2/21/20 for PT, OT, nursing cares, medical management and monitoring.     TCU Course:  There were no complications during his TCU stay. He has slow progress with therapies, cognitive impairment, overall baseline weakness. He completed abx for UTI without incident or recurrence. He had no new medication changes. He is ready to discharge back to [facility] tomorrow with therapies set up.       PHYSICAL EXAM:   General: Patient is alert  elderly male, no distress.   Vitals: /60, Temp 98.5, Pulse 74, RR 18, O2 sat 98%RA.  HEENT: Head is NCAT. Eyes show no injection or icterus. Nares negative. Oropharynx well hydrated.  : Deferred.  Extremities: No edema is noted.  Musculoskeletal: Age related degen changes.   Skin: No rashes.   Psych: Dementia.      Labs:  Results for orders placed or performed in visit on 11/04/19   Basic Metabolic Panel   Result Value Ref Range    Sodium 136 136 - 145 mmol/L    Potassium 5.0 3.5 - 5.0 mmol/L    Chloride 106 98 - 107 mmol/L    CO2 21 (L) 22 - 31 mmol/L    Anion Gap, Calculation 9 5 - 18 mmol/L    Glucose 91 70 - 125 mg/dL    Calcium 9.2 8.5 - 10.5 mg/dL    BUN 33 (H) 8 - 28 mg/dL         MEDICATION LIST:  Current Outpatient Medications   Medication Sig     acetaminophen (TYLENOL) 500 MG tablet Take 1,000 mg by mouth 3 (three) times a day as needed for pain.     aspirin 81 MG EC tablet Take 81 mg by mouth every evening.            dimethicone/zinc oxide (GEOVANNI PROTECT TOP) Apply 1 application topically. Apple to affected area daily skin lesion     docoshexanoic acid-eicosapent 500 mg (FISH OIL) 500-100 mg cap capsule Take 500 mg by mouth daily.     enzalutamide (XTANDI) 40 mg cap Take 1 tablet by mouth daily.     leuprolide (LUPRON) 30 mg injection Inject 30 mg into the shoulder, thigh, or buttocks every 6 (six) months.      multivitamin therapeutic tablet Take 1 tablet by mouth daily.       DISCHARGE DIAGNOSIS:  1. UTI. Completed abx.   2. HTN. Not on BP meds. BPs satisfactory.   3. Dementia. Resides in [place].  4. CKD. Stage 3.   5. Hx prostate cancer.   6. Weakness. Baseline poor functional ability.     Total time greater than 30 minutes discharge coordination.      MEDICAL EQUIPMENT NEEDS:  No new needs.      DISCHARGE PLAN/FACE TO FACE:  I certify that services are/were furnished while this patient was under the care of a physician and that a physician or an allowed non-physician practitioner (NPP), had a  face-to-face encounter that meets the physician face-to-face encounter requirements. The encounter was in whole, or in part, related to the primary reason for home health. The patient is confined to his/her home and needs intermittent skilled nursing, physical therapy, speech-language pathology, or the continued need for occupational therapy. A plan of care has been established by a physician and is periodically reviewed by a physician.    Date of Face-to-Face Encounter: 4/2/20.    I certify that, based on my findings, the following services are medically necessary home health services: PT, OT, HHA, RN.    My clinical findings support the need for the above skilled services because: Overall weakness and deconditioning, advanced age and dementia, recent treatment for UTI, hx of prostate cancer. Needs additional therapies as he returns home to re-adjust to surroundings. Nurse for clinical assessments, aide to assist with cares.     This patient is homebound because: Too strenuous to leave the home.     The patient is, or has been, under my care and I have initiated the establishment of the plan of care. This patient will be followed by a physician who will periodically review the plan of care.    Schedule follow up visit with primary care provider within 7 days to reestablish care.    Electronically signed by: Yahaira Carmen MD

## 2021-06-07 NOTE — PROGRESS NOTES
[facility]    Facility:   [facility] [[id number]]   Code Status: DNR  PCP: Jaime Watkins MD   Phone: [phone]   Fax: [phone]      CHIEF COMPLAINT/REASON FOR VISIT:  Chief Complaint   Patient presents with     Discharge Summary       HISTORY COURSE:  Federico is a 89 y.o. male undergoing physical and occupational therapy at [facility] transitional care unit.  He is with past medical history significant for hypertension, prostate cancer, dementia,  renal insufficiency stage 3, who presented to the ER after sustaining a fall a few days prior at his care facility. He was brought to the ER on 2/18 for reports of leg weakness. Of note he is essentially wheelchair bound. He was found to have a UTI and placed on rocephin and will completed a course of Ceftin on  2/28/20.      Today he is seen for a face to face for  discharge. He was scheduled to discharge on 3/27/20 back to [facility].with current medications and treatments however as I was writing his discharge orders a call came in from the facility that they were unable to take him back without another assessment. His discharge has been delayed.      He denied chest pain or shortness of breath, denied constipation or diarrhea, he denied any pain or burning. He completed  Ceftin on  2/28/2020 for UTI.  He is alert and oriented to self only.  Vital signs are stable.  He is mostly wheelchair-bound he does continue to have left leg weakness.      Review of Systems  Constitutional: Positive for fatigue. Negative for activity change, appetite change, chills and fever.   HENT: Negative for congestion and sore throat.    Eyes: Negative for visual disturbance.   Respiratory: Negative for cough, shortness of breath and wheezing.    Cardiovascular: Negative for chest pain and leg swelling.   Gastrointestinal: Negative for abdominal distention, abdominal pain, constipation, diarrhea and nausea.    Genitourinary: Negative for dysuria.        Completed treatment  For a  UTI   Musculoskeletal: Negative for arthralgias and myalgias.   Skin: Negative for color change, rash and wound.   Neurological: Negative for dizziness, weakness and numbness.   Psychiatric/Behavioral: Negative for agitation, behavioral problems and sleep disturbance.   Vitals:    03/25/20 0954   BP: 142/70   Pulse: 70   Resp: 16   Temp: 98.3  F (36.8  C)   SpO2: 97%   Weight: 142 lb 1.6 oz (64.5 kg)       Physical Exam  Constitutional:       Appearance: He is well-developed.      Comments: Pleasantly confused gentleman in no acute distress   HENT:      Head: Normocephalic.   Eyes:      Conjunctiva/sclera: Conjunctivae normal.   Neck:      Musculoskeletal: Normal range of motion.   Cardiovascular:      Rate and Rhythm: Normal rate and regular rhythm.      Heart sounds: Normal heart sounds. No murmur.   Pulmonary:      Effort: No respiratory distress.      Breath sounds: Normal breath sounds. No wheezing or rales.   Abdominal:      General: Bowel sounds are normal. There is no distension.      Palpations: Abdomen is soft.      Tenderness: There is no abdominal tenderness.   Musculoskeletal: Normal range of motion.   Skin:     General: Skin is warm.      Comments: Excoriated bottom.    Neurological:      Mental Status: He is alert and oriented to person, place, and time.   Psychiatric:         Behavior: Behavior normal.   MEDICATION LIST:  Current Outpatient Medications   Medication Sig     acetaminophen (TYLENOL) 500 MG tablet Take 1,000 mg by mouth 3 (three) times a day as needed for pain.     aspirin 81 MG EC tablet Take 81 mg by mouth every evening.            dimethicone/zinc oxide (GEOVANNI PROTECT TOP) Apply 1 application topically. Apple to affected area daily skin lesion     docoshexanoic acid-eicosapent 500 mg (FISH OIL) 500-100 mg cap capsule Take 500 mg by mouth daily.     multivitamin therapeutic tablet Take 1 tablet by mouth daily.      enzalutamide (XTANDI) 40 mg cap Take 1 tablet by mouth daily.     leuprolide (LUPRON) 30 mg injection Inject 30 mg into the shoulder, thigh, or buttocks every 6 (six) months.        DISCHARGE DIAGNOSIS:    ICD-10-CM    1. Essential hypertension  I10    2. Weakness  R53.1    3. Failure to thrive in adult  R62.7        Left fifth toe nail damaged healed      Weakness PT OT     Malnutrition dietary to follow     Prostate cancer Xtandi and leuprolide currently on hold in the transitional care unit patient to follow-up with oncology.      UTI completed  Ceftin  on 2/28/2020     Buttock Excoriation- prior home cream resumed. Facility wound care team is currently following him weekly.     Electronically signed by: Elissa Diaz CNP

## 2021-06-16 PROBLEM — R53.1 WEAKNESS: Status: ACTIVE | Noted: 2019-10-16

## 2021-06-16 PROBLEM — E78.5 DYSLIPIDEMIA: Status: ACTIVE | Noted: 2019-10-16

## 2021-06-16 PROBLEM — E46 MALNUTRITION (H): Status: ACTIVE | Noted: 2019-10-16

## 2021-06-16 PROBLEM — R62.7 FAILURE TO THRIVE IN ADULT: Status: ACTIVE | Noted: 2019-10-16

## 2021-06-16 PROBLEM — N18.9 ACUTE KIDNEY INJURY SUPERIMPOSED ON CKD (H): Status: ACTIVE | Noted: 2019-10-16

## 2021-06-16 PROBLEM — N17.9 ACUTE KIDNEY INJURY SUPERIMPOSED ON CKD (H): Status: ACTIVE | Noted: 2019-10-16

## 2021-06-16 PROBLEM — G30.9 ALZHEIMER'S DEMENTIA (H): Status: ACTIVE | Noted: 2019-10-16

## 2021-06-16 PROBLEM — I10 ESSENTIAL HYPERTENSION: Status: ACTIVE | Noted: 2019-10-16

## 2021-06-16 PROBLEM — F02.80 ALZHEIMER'S DEMENTIA (H): Status: ACTIVE | Noted: 2019-10-16

## 2021-06-16 PROBLEM — C61 PROSTATE CANCER (H): Status: ACTIVE | Noted: 2019-10-16

## 2021-06-19 NOTE — LETTER
Letter by Elissa Diaz CNP at      Author: Elissa Diaz CNP Service: -- Author Type: --    Filed:  Encounter Date: 10/29/2019 Status: Signed         Patient: Federico Epps   MR Number: [id number]   YOB: 1930   Date of Visit: 10/29/2019     [facility]    Facility:   [facility] [[id number]]   Code Status: FULL CODE      CHIEF COMPLAINT/REASON FOR VISIT:  Chief Complaint   Patient presents with   ? Review Of Multiple Medical Conditions       HISTORY:      HPI: Federico is a 89 y.o. male undergoing physical and occupational therapy at [facility]. He is with a history of: prostate cancer, dementia and hypertension.  He was admitted on 10/11/2019, through [place] ER, with failure to thrive and poor oral intake. CT head revealed no acute pathology.In the ER, he was found to be in [place].  He was provided IVF and Lisinopril was discontinued. Creatinine has returned to baseline.  Lactate was elevated and normalized quickly. There was no evidence for acute infectious process.    Today is seen for a routine visit to review multiple medical issues.  He denies chest pain or shortness of breath.  He is moving his bowels and denied urinary issues.  He is being followed closely by dietary and placed on supplements. He did have a recent 2 pound weight loss but overall he is up 10 pounds since 10/14/19.  Slightly abnormal BMP on 10/17 and as of 10/28/19 his creatinine has improved from 1.40 to 1.32 and GRF from 48 to 51. Labs to be rechecked on 11/4/19.  He contiues to  be oriented to himself and pleasantly confused.          Past Medical History:   Diagnosis Date   ? Cholelithiases     pt. unaware   ? CTS (carpal tunnel syndrome)     bilateral   ? HTN (hypertension)    ? IGT (impaired glucose tolerance)    ? Loss of hearing     bilateral hearing aids   ? Prostate cancer (H)    ? Renal insufficiency    ? Renal insufficiency              Family  History   Problem Relation Age of Onset   ? Cancer Father    ? Heart disease Father    ? Heart disease Sister    ? Stroke Brother    ? Heart disease Sister      Social History     Socioeconomic History   ? Marital status:      Spouse name: Not on file   ? Number of children: Not on file   ? Years of education: Not on file   ? Highest education level: Not on file   Occupational History   ? Not on file   Social Needs   ? Financial resource strain: Not on file   ? Food insecurity:     Worry: Not on file     Inability: Not on file   ? Transportation needs:     Medical: Not on file     Non-medical: Not on file   Tobacco Use   ? Smoking status: Never Smoker   ? Smokeless tobacco: Never Used   Substance and Sexual Activity   ? Alcohol use: Yes     Comment: once a month or less   ? Drug use: No   ? Sexual activity: Not on file   Lifestyle   ? Physical activity:     Days per week: Not on file     Minutes per session: Not on file   ? Stress: Not on file   Relationships   ? Social connections:     Talks on phone: Not on file     Gets together: Not on file     Attends Confucianism service: Not on file     Active member of club or organization: Not on file     Attends meetings of clubs or organizations: Not on file     Relationship status: Not on file   ? Intimate partner violence:     Fear of current or ex partner: Not on file     Emotionally abused: Not on file     Physically abused: Not on file     Forced sexual activity: Not on file   Other Topics Concern   ? Not on file   Social History Narrative   ? Not on file         Review of Systems   Constitutional: Positive for activity change and appetite change. Negative for chills, fatigue and fever.        As current diagnosis of failure to thrive being followed closely by dietary.   HENT: Negative for congestion and sore throat.    Eyes: Negative for visual disturbance.   Respiratory: Negative for cough, shortness of breath and wheezing.    Cardiovascular: Negative for chest  pain and leg swelling.   Gastrointestinal: Negative for abdominal distention, abdominal pain, constipation, diarrhea and nausea.   Genitourinary: Negative for dysuria.   Musculoskeletal: Negative for arthralgias and myalgias.   Skin: Negative for color change, rash and wound.   Neurological: Negative for dizziness, weakness and numbness.   Psychiatric/Behavioral: Negative for agitation, behavioral problems and sleep disturbance.       Vitals:    10/29/19 0737   BP: 128/62   Pulse: 85   Resp: 16   Temp: 100.1  F (37.8  C)   SpO2: 98%   Weight: 155 lb 6.4 oz (70.5 kg)       Physical Exam  Constitutional:       Appearance: He is well-developed.      Comments: Pleasant gentleman in no acute distress   HENT:      Head: Normocephalic.   Eyes:      Conjunctiva/sclera: Conjunctivae normal.   Neck:      Musculoskeletal: Normal range of motion.   Cardiovascular:      Rate and Rhythm: Normal rate and regular rhythm.      Heart sounds: Normal heart sounds. No murmur.   Pulmonary:      Effort: No respiratory distress.      Breath sounds: Normal breath sounds. No wheezing or rales.   Abdominal:      General: Bowel sounds are normal. There is no distension.      Palpations: Abdomen is soft.      Tenderness: There is no tenderness.   Musculoskeletal: Normal range of motion.   Skin:     General: Skin is warm.   Neurological:      Mental Status: He is alert and oriented to person, place, and time.   Psychiatric:         Behavior: Behavior normal.           LABS:   Recent Results (from the past 240 hour(s))   Basic Metabolic Panel   Result Value Ref Range    Sodium 136 136 - 145 mmol/L    Potassium 4.4 3.5 - 5.0 mmol/L    Chloride 103 98 - 107 mmol/L    CO2 28 22 - 31 mmol/L    Anion Gap, Calculation 5 5 - 18 mmol/L    Glucose 103 70 - 125 mg/dL    Calcium 9.2 8.5 - 10.5 mg/dL    BUN 33 (H) 8 - 28 mg/dL    Creatinine 1.32 (H) 0.70 - 1.30 mg/dL    GFR MDRD Af Amer >60 >60 mL/min/1.73m2    GFR MDRD Non Af Amer 51 (L) >60 mL/min/1.73m2         Current Outpatient Medications   Medication Sig   ? acetaminophen (TYLENOL) 500 MG tablet Take 1,000 mg by mouth 3 (three) times a day as needed for pain.   ? aspirin 81 MG EC tablet Take 81 mg by mouth every evening.          ? docoshexanoic acid-eicosapent 500 mg (FISH OIL) 500-100 mg cap capsule Take 500 mg by mouth daily.   ? enzalutamide (XTANDI) 40 mg cap Take 1 tablet by mouth daily.   ? leuprolide (LUPRON) 30 mg injection Inject 30 mg into the shoulder, thigh, or buttocks every 6 (six) months.    ? multivitamin therapeutic tablet Take 1 tablet by mouth daily.   .ASSESSMENT:      ICD-10-CM    1. Weakness R53.1    2. Essential hypertension I10    3. Fever, unspecified fever cause R50.9    4. Moderate protein-calorie malnutrition (H) E44.0        PLAN:    Weakness PT OT    Hypertension currently off of his lisinopril and blood pressure stable at 128/62    Dementia noted to be oriented to self only pleasantly confused.  Provide a safe and comfortable environment    Failure to thrive being followed closely by dietary currently showing a 2 pound weight loss in the last 10 days, on supplements but overall up 10 pounds since 10/14/19.     Acute kidney injury last BMP was done 10/28/19 Creatine and GFR improving  on 1017 and labs are improving.    Continue to monitor labs    Electronically signed by: Elissa Diaz CNP

## 2021-06-19 NOTE — LETTER
Letter by Yahaira Carmen MD at      Author: Yahaira Carmen MD Service: -- Author Type: --    Filed:  Encounter Date: 10/17/2019 Status: Signed         Patient: Federico Epps   MR Number: [id number]   YOB: 1930   Date of Visit: 10/17/2019     [facility]      Facility:    [facility] [[id number]]  Code Status: FULL CODE       Chief Complaint/Reason for Visit:  Chief Complaint   Patient presents with   ? H & P     Admit note to TCU-FTT, prostate cancer, dementia, SALLY on CKD.       HPI:   Federico is a 89 y.o. male with hx of prostate cancer, HTN, dementia, presented to the hospital on 10/11/19 with info as noted below.     Hospital Course:   Mr. Federico Epps is an 88 y.o. male with a history of: prostate cancer, dementia and hypertension.  He was admitted on 10/11/2019, through [place] [facility], with failure to thrive and poor oral intake. He is a rather poor historian due to underlying dementia. He was admitted to inpatient. CT head revealed no acute pathology. In the ER, he was found to be in SALLY.  He was provided IVF and Lisinopril was discontinued. Creatinine has returned to baseline. Lactate was elevated and normalized quickly. There was no evidence for acute infectious process. Therapies were consulted and recommended TCU at discharge. Family were in agreement. Lisinopril was discontinued due to SALLY/CKD and his blood pressure has been stable. Could consider alternative antihypertensive agent, if needed.    Overall stabilized and discharged to TCU on 10/14/19 for PT, OT, nursing cares, medical management and monitoring.     Today:  Due to dementia, unable to obtain full accurate ROS. No family present at time of visit. Nursing reports no concerns. He denies any complaints. No hypoxia or need for oxygen. No fever or cough noted. Appetite is fair. No vomiting, diarrhea constipation. No complaints of abdominal pain. He denies shortness of  breath or chest pain. He is [place], no reported new vision problems.       Past Medical History:  Past Medical History:   Diagnosis Date   ? Cholelithiases     pt. unaware   ? CTS (carpal tunnel syndrome)     bilateral   ? HTN (hypertension)    ? IGT (impaired glucose tolerance)    ? Loss of hearing     bilateral hearing aids   ? Prostate cancer (H)    ? Renal insufficiency    ? Renal insufficiency            Surgical History:  Past Surgical History:   Procedure Laterality Date   ? CATARACT EXTRACTION Bilateral    ? HERNIA REPAIR Left    ? MD REVISE MEDIAN N/CARPAL TUNNEL SURG Right 3/31/2015    Procedure: RIGHT CARPAL TUNNEL RELEASE;  Surgeon: Henry Mora MD;  Location: [facility];  Service: Orthopedics   ? PROSTATECTOMY  2010    cryoablation   ? TOTAL HIP ARTHROPLASTY Bilateral        Family History:   Family History   Problem Relation Age of Onset   ? Cancer Father    ? Heart disease Father    ? Heart disease Sister    ? Stroke Brother    ? Heart disease Sister        Social History:    Social History     Socioeconomic History   ? Marital status:      Spouse name: Not on file   ? Number of children: Not on file   ? Years of education: Not on file   ? Highest education level: Not on file   Occupational History   ? Not on file   Social Needs   ? Financial resource strain: Not on file   ? Food insecurity:     Worry: Not on file     Inability: Not on file   ? Transportation needs:     Medical: Not on file     Non-medical: Not on file   Tobacco Use   ? Smoking status: Never Smoker   ? Smokeless tobacco: Never Used   Substance and Sexual Activity   ? Alcohol use: Yes     Comment: once a month or less   ? Drug use: No   ? Sexual activity: Not on file   Lifestyle   ? Physical activity:     Days per week: Not on file     Minutes per session: Not on file   ? Stress: Not on file   Relationships   ? Social connections:     Talks on phone: Not on file     Gets together: Not on file     Attends Yazidism  service: Not on file     Active member of club or organization: Not on file     Attends meetings of clubs or organizations: Not on file     Relationship status: Not on file   ? Intimate partner violence:     Fear of current or ex partner: Not on file     Emotionally abused: Not on file     Physically abused: Not on file     Forced sexual activity: Not on file   Other Topics Concern   ? Not on file   Social History Narrative   ? Not on file       Medications:  Current Outpatient Medications   Medication Sig   ? acetaminophen (TYLENOL) 500 MG tablet Take 1,000 mg by mouth 3 (three) times a day as needed for pain.   ? aspirin 81 MG EC tablet Take 81 mg by mouth every evening.          ? docoshexanoic acid-eicosapent 500 mg (FISH OIL) 500-100 mg cap capsule Take 500 mg by mouth daily.   ? enzalutamide (XTANDI) 40 mg cap Take 1 tablet by mouth daily.   ? leuprolide (LUPRON) 30 mg injection Inject 30 mg into the shoulder, thigh, or buttocks every 6 (six) months.    ? multivitamin therapeutic tablet Take 1 tablet by mouth daily.       Allergies:  Allergies   Allergen Reactions   ? Other Food Allergy Anaphylaxis     Peanuts caused edema / throat swelling / closing        Review of Systems:  Pertinent items are noted in HPI. unable to complete due to dementia.      Physical Exam:   General: Patient is alert elderly male, no distress.   Vitals: /62, Temp 98.8, Pulse 77 , RR 18, O2 sat 100% RA.  HEENT: Head is NCAT. Eyes show no injection or icterus. Nares negative. Oropharynx well hydrated.  Neck: Supple. No tenderness or adenopathy. No JVD.  Lungs: Clear bilaterally. No wheezes.  Cardiovascular: Regular rate and rhythm, normal S1, S2.  Back: No spinal or CVA tenderness.  Abdomen: Soft, no tenderness on exam. Bowel sounds present. No guarding rebound or rigidity.  : Deferred.  Extremities: No edema is noted.  Musculoskeletal: Age related degen changes.  Skin: Warm and dry.   Psych: Difficult to assess due to  dementia.      Labs:  Results for orders placed or performed in visit on 10/17/19   Basic Metabolic Panel   Result Value Ref Range    Sodium 134 (L) 136 - 145 mmol/L    Potassium 3.9 3.5 - 5.0 mmol/L    Chloride 103 98 - 107 mmol/L    CO2 26 22 - 31 mmol/L    Anion Gap, Calculation 5 5 - 18 mmol/L    Glucose 116 70 - 125 mg/dL    Calcium 9.6 8.5 - 10.5 mg/dL    BUN 29 (H) 8 - 28 mg/dL    Creatinine 1.40 (H) 0.70 - 1.30 mg/dL    GFR MDRD Af Amer 58 (L) >60 mL/min/1.73m2    GFR MDRD Non Af Amer 48 (L) >60 mL/min/1.73m2       Assessment/Plan:  1. Dementia. Not on dementia meds. Confused at baseline. Lives with wife.  2. HTN. BPs satisfactory. Not currently on BP meds,  lisinopril was discontinued in the hospital. Continue to monitor for any need for meds.  3. Prostate cancer. He is on Xtandi. Follow up per usual with urology.  4. SALLY. On CKD. Labs as noted above. Check periodically.  5. FTT. Overall not doing well at home, advanced age, dementia. SW involved for consideration of alternative living situation.  6. Code status is full code.          Electronically signed by: Yahaira Carmen MD

## 2021-06-19 NOTE — LETTER
Letter by Elissa Diaz CNP at      Author: Elissa Diaz CNP Service: -- Author Type: --    Filed:  Encounter Date: 10/25/2019 Status: Signed         Patient: Federico Epps   MR Number: [id number]   YOB: 1930   Date of Visit: 10/25/2019     [facility]    Facility:   [facility] [[id number]]   Code Status: FULL CODE      CHIEF COMPLAINT/REASON FOR VISIT:  Chief Complaint   Patient presents with   ? Review Of Multiple Medical Conditions       HISTORY:      HPI: Federico is a 89 y.o. male undergoing physical and occupational therapy at [facility]. He is with a history of: prostate cancer, dementia and hypertension.  He was admitted on 10/11/2019, through [place] [facility], with failure to thrive and poor oral intake. CT head revealed no acute pathology.In the ER, he was found to be in SALLY.  He was provided IVF and Lisinopril was discontinued. Creatinine has returned to baseline.  Lactate was elevated and normalized quickly. There was no evidence for acute infectious process.    Today is seen for a routine visit to review multiple medical issues.  He denies chest pain or shortness of breath.  He is moving his bowels and denied urinary issues.  He is being followed closely by dietary and placed on supplements however he does show a 2  pound weight loss in the last 10 days and I did update dietary on this finding.  Slightly abnormal BMP on 10/17 and this is being  monitored.  He is noted to be oriented to himself and pleasantly confused.          Past Medical History:   Diagnosis Date   ? Cholelithiases     pt. unaware   ? CTS (carpal tunnel syndrome)     bilateral   ? HTN (hypertension)    ? IGT (impaired glucose tolerance)    ? Loss of hearing     bilateral hearing aids   ? Prostate cancer (H)    ? Renal insufficiency    ? Renal insufficiency              Family History   Problem Relation Age of Onset   ? Cancer Father    ? Heart disease  Father    ? Heart disease Sister    ? Stroke Brother    ? Heart disease Sister      Social History     Socioeconomic History   ? Marital status:      Spouse name: Not on file   ? Number of children: Not on file   ? Years of education: Not on file   ? Highest education level: Not on file   Occupational History   ? Not on file   Social Needs   ? Financial resource strain: Not on file   ? Food insecurity:     Worry: Not on file     Inability: Not on file   ? Transportation needs:     Medical: Not on file     Non-medical: Not on file   Tobacco Use   ? Smoking status: Never Smoker   ? Smokeless tobacco: Never Used   Substance and Sexual Activity   ? Alcohol use: Yes     Comment: once a month or less   ? Drug use: No   ? Sexual activity: Not on file   Lifestyle   ? Physical activity:     Days per week: Not on file     Minutes per session: Not on file   ? Stress: Not on file   Relationships   ? Social connections:     Talks on phone: Not on file     Gets together: Not on file     Attends Scientologist service: Not on file     Active member of club or organization: Not on file     Attends meetings of clubs or organizations: Not on file     Relationship status: Not on file   ? Intimate partner violence:     Fear of current or ex partner: Not on file     Emotionally abused: Not on file     Physically abused: Not on file     Forced sexual activity: Not on file   Other Topics Concern   ? Not on file   Social History Narrative   ? Not on file         Review of Systems   Constitutional: Positive for activity change and appetite change. Negative for chills, fatigue and fever.        As current diagnosis of failure to thrive being followed closely by dietary.   HENT: Negative for congestion and sore throat.    Eyes: Negative for visual disturbance.   Respiratory: Negative for cough, shortness of breath and wheezing.    Cardiovascular: Negative for chest pain and leg swelling.   Gastrointestinal: Negative for abdominal  distention, abdominal pain, constipation, diarrhea and nausea.   Genitourinary: Negative for dysuria.   Musculoskeletal: Negative for arthralgias and myalgias.   Skin: Negative for color change, rash and wound.   Neurological: Negative for dizziness, weakness and numbness.   Psychiatric/Behavioral: Negative for agitation, behavioral problems and sleep disturbance.       Vitals:    10/25/19 0729   BP: 122/78   Pulse: 78   Resp: 16   Temp: 97.3  F (36.3  C)   SpO2: 98%   Weight: 143 lb 6.4 oz (65 kg)       Physical Exam  Constitutional:       Appearance: He is well-developed.      Comments: Pleasant gentleman in no acute distress   HENT:      Head: Normocephalic.   Eyes:      Conjunctiva/sclera: Conjunctivae normal.   Neck:      Musculoskeletal: Normal range of motion.   Cardiovascular:      Rate and Rhythm: Normal rate and regular rhythm.      Heart sounds: Normal heart sounds. No murmur.   Pulmonary:      Effort: No respiratory distress.      Breath sounds: Normal breath sounds. No wheezing or rales.   Abdominal:      General: Bowel sounds are normal. There is no distension.      Palpations: Abdomen is soft.      Tenderness: There is no tenderness.   Musculoskeletal: Normal range of motion.   Skin:     General: Skin is warm.   Neurological:      Mental Status: He is alert and oriented to person, place, and time.   Psychiatric:         Behavior: Behavior normal.           LABS:   No results found for this or any previous visit (from the past 240 hour(s)).     Current Outpatient Medications   Medication Sig   ? acetaminophen (TYLENOL) 500 MG tablet Take 1,000 mg by mouth 3 (three) times a day as needed for pain.   ? aspirin 81 MG EC tablet Take 81 mg by mouth every evening.          ? docoshexanoic acid-eicosapent 500 mg (FISH OIL) 500-100 mg cap capsule Take 500 mg by mouth daily.   ? enzalutamide (XTANDI) 40 mg cap Take 1 tablet by mouth daily.   ? leuprolide (LUPRON) 30 mg injection Inject 30 mg into the shoulder,  thigh, or buttocks every 6 (six) months.    ? multivitamin therapeutic tablet Take 1 tablet by mouth daily.   .ASSESSMENT:      ICD-10-CM    1. Weakness R53.1    2. Essential hypertension I10    3. Late onset Alzheimer's disease without behavioral disturbance (H) G30.1     F02.80    4. Failure to thrive in adult R62.7        PLAN:    Weakness PT OT    Hypertension currently off of his lisinopril and blood pressure stable at 122/78    Dementia noted to be oriented to self only pleasantly confused.  Provide a safe and comfortable environment    Failure to thrive being followed closely by dietary currently showing a 2 pound weight loss in the last 10 days, on supplements    Acute kidney injury last BMP was done on 1017 and labs are improving.  His creatinine is down to 1.40 from 1.71 and his GFR is up to 48 from 17.  Continue to monitor labs    Electronically signed by: Elissa Diaz CNP

## 2021-06-19 NOTE — LETTER
Letter by Elissa Diaz CNP at      Author: Elissa Diaz CNP Service: -- Author Type: --    Filed:  Encounter Date: 11/11/2019 Status: Signed         Patient: Federico Epps   MR Number: [id number]   YOB: 1930   Date of Visit: 11/11/2019     [facility]    Facility[place]   [facility] [place] [place] [[id number]]   Code Status: DNR  PCP: Jaime Watkins MD   Phone: [phone]   Fax: [phone]      CHIEF COMPLAINT/REASON FOR VISIT:  Chief Complaint   Patient presents with   ? Discharge Summary       HISTORY COURSE:  Federico is a 89 y.o. male undergoing physical and occupational therapy at [facility]. He is with a history of: prostate cancer, dementia and hypertension.  He was admitted on 10/11/2019, through [place] [facility], with failure to thrive and poor oral intake. CT head revealed no acute pathology.In the ER, he was found to be in SALLY.  He was provided IVF and Lisinopril was discontinued. Creatinine has returned to baseline.  Lactate was elevated and normalized quickly. There was no evidence for acute infectious process.     Today is seen for a face-to-face for discharge.  He will discharge to [facility] on 11/12/2019 with current medications and treatments.  He will also have hospitals home care services patient does need maximum assist with cares. He denies chest pain or shortness of breath.  He is moving his bowels and denied urinary issues.     He continues on supplements.He had a slightly  abnormal BMP on 10/17 and as of 10/28/19 his creatinine has improved from 1.40 to 1.32 and GRF from 48 to 51. Labs were rechecked on 11/4/19 and his creatinine is now 1.20 and GFR 57.   He contiues to  be oriented to himself and pleasantly confused.      Wheelchair face-to-face my patient Federico Epps will need a wheelchair for discharge due to the diagnosis of adult failure to thrive, osteoarthritis, malignant neoplasm of prostate, protein calorie  malnutrition, difficulty walking my patient has the following limitations of mobility and ability to participate in  ADL as such as food prep, dressing, transferring, and ambulation.  The patient's mobility limitations cannot be sufficiently and safely resolved by use of a cane or walker.  His current home has adequate space for maneuvering a manual wheelchair the patient and caregiver able to safely use a manual wheelchair, the patient will use the wheelchair daily, use of the wheelchair will improve the participation in MR ADL less for the patient.  My patient will require and benefit from an 18 inch x 18 inch standard manual wheelchair with bilateral standard foot rest, bilateral full-length armrests and 18 inch x 18 inch cushion.  A cushion is necessary since my patient sits in a wheelchair for greater than 8 hours/day placing him at high risk for skin breakdown.  This equipment is necessary for the duration of the patient's lifetime.    Review of Systems  Constitutional: Positive for activity change and appetite change. Negative for chills, fatigue and fever.        As current diagnosis of failure to thrive being followed closely by dietary.   HENT: Negative for congestion and sore throat.    Eyes: Negative for visual disturbance.   Respiratory: Negative for cough, shortness of breath and wheezing.    Cardiovascular: Negative for chest pain and leg swelling.   Gastrointestinal: Negative for abdominal distention, abdominal pain, constipation, diarrhea and nausea.   Genitourinary: Negative for dysuria.   Musculoskeletal: Negative for arthralgias and myalgias.   Skin: Negative for color change, rash and wound.   Neurological: Negative for dizziness, weakness and numbness.   Psychiatric/Behavioral: Negative for agitation, behavioral problems and sleep disturbance  Vitals:    11/11/19 0927   BP: 146/70   Pulse: 78   Resp: 20   Temp: 98.3  F (36.8  C)   SpO2: 92%   Weight: 141 lb 9.6 oz (64.2 kg)       Physical  Exam  Constitutional:       Appearance: He is well-developed.      Comments: Pleasant gentleman in no acute distress   HENT:      Head: Normocephalic.   Eyes:      Conjunctiva/sclera: Conjunctivae normal.   Neck:      Musculoskeletal: Normal range of motion.   Cardiovascular:      Rate and Rhythm: Normal rate and regular rhythm.      Heart sounds: Normal heart sounds. No murmur.   Pulmonary:      Effort: No respiratory distress.      Breath sounds: Normal breath sounds. No wheezing or rales.   Abdominal:      General: Bowel sounds are normal. There is no distension.      Palpations: Abdomen is soft.      Tenderness: There is no tenderness.   Musculoskeletal: Normal range of motion.   Skin:     General: Skin is warm.   Neurological:      Mental Status: He is alert and oriented to person, place, and time.   Psychiatric:         Behavior: Behavior normal.    MEDICATION LIST:  Current Outpatient Medications   Medication Sig   ? acetaminophen (TYLENOL) 500 MG tablet Take 1,000 mg by mouth 3 (three) times a day as needed for pain.   ? aspirin 81 MG EC tablet Take 81 mg by mouth every evening.          ? docoshexanoic acid-eicosapent 500 mg (FISH OIL) 500-100 mg cap capsule Take 500 mg by mouth daily.   ? enzalutamide (XTANDI) 40 mg cap Take 1 tablet by mouth daily.   ? leuprolide (LUPRON) 30 mg injection Inject 30 mg into the shoulder, thigh, or buttocks every 6 (six) months.    ? multivitamin therapeutic tablet Take 1 tablet by mouth daily.       DISCHARGE DIAGNOSIS:    ICD-10-CM    1. Weakness R53.1    2. Failure to thrive in adult R62.7    3. Late onset Alzheimer's disease without behavioral disturbance (H) G30.1     F02.80    4. Dyslipidemia E78.5        MEDICAL EQUIPMENT NEEDS:  Wheelchair ordered    DISCHARGE PLAN/FACE TO FACE:  I certify that services are/were furnished while this patient was under the care of a physician and that a physician or an allowed non-physician practitioner (NPP), had a face-to-face  encounter that meets the physician face-to-face encounter requirements. The encounter was in whole, or in part, related to the primary reason for home health. The patient is confined to his/her home and needs intermittent skilled nursing, physical therapy, speech-language pathology, or the continued need for occupational therapy. A plan of care has been established by a physician and is periodically reviewed by a physician.  Date of Face-to-Face Encounter: 11/11/19    I certify that, based on my findings, the following services are medically necessary home health services: GSS/PT/OT/HHA  And RN     My clinical findings support the need for the above skilled services because: PT OT for continued strength and endurance, home health aide to assist with activities of daily living, and RN for vital signs and medication management.    This patient is homebound because: He is deconditioned and easily fatigued following recent hospitalization with failure to thrive and poor oral intake.  He is also oriented to self only and is with cognitive impairment making him unsafe to leave the home or facility unassisted.    The patient is, or has been, under my care and I have initiated the establishment of the plan of care. This patient will be followed by a physician who will periodically review the plan of care.    Schedule follow up visit with primary care provider within 7 days to reestablish care.    Electronically signed by: Elissa Diaz CNP

## 2021-06-19 NOTE — LETTER
Letter by Vanessa Jensen NP at      Author: Vanessa Jensen NP Service: -- Author Type: --    Filed:  Encounter Date: 10/16/2019 Status: Signed         Patient: Federico Epps   MR Number: [id number]   YOB: 1930   Date of Visit: 10/16/2019                 [facility]    DATE: 10/16/2019    NAME:  Federico Epps             :  10/26/1930  MRN: [id number]  CODE STATUS:  FULL CODE    VISIT TYPE: Problem Visit (hospital f/u)     FACILITY:  [facility] [[id number]]       CHIEF COMPLAIN/REASON FOR VISIT:    Chief Complaint   Patient presents with   ? Problem Visit     hospital f/u               HISTORY OF PRESENT ILLNESS: Federico Epps is a 88 y.o. male who was admitted to [facility] 10/11-10/14 for anorexia, weakness, failure to thrive. He was treated for SALLY on CKD with IV fluids. Lisinopril was discontinued due to SALLY. Infectious workup was negative. He was recommended to discharge to TCU for rehab. He has PMH of prostate cancer, dementia, hypertension. Prior to this he lived with wife.     Today Mr. Epps is seen for hospital follow up today. He is seen in wheelchair in his room with no family present. He is confused but calm and cooperative. He says he sleeps fine and has no pain. He thinks his appetite is good and has not had any stomach upset or nausea. He denies shortness of breath or cough. He doesn't know how therapy is going. He thinks bowels are ok but doesn't know when he last went. He is not sure if he has eaten today. He is unable to state where he is, why he is here, or what month or year it is. He is quite hard of hearing as well but has hearing aids in place. Per staff he has been quiet and not complaining of anything. Staff denies any concerns with him. His vitals have been stable and admission weight was 145lbs. He is not on many medications. He has a bmp that was ordered by the hospital for tomorrow. His appetite is  fair per staff.     REVIEW OF SYSTEMS:  PROBLEMS AND REVIEW OF SYSTEMS:   Today on ROS per staff and patient:   Currently, no fever, chills, or rigors. Decreased vision and hearing. Denies any chest pain, dizziness, shortness of breath, or cough.  Positive for weakness, confusion, forgetfulness, appetite fair, unable to obtain further ROS due to cognition      Allergies   Allergen Reactions   ? Other Food Allergy Anaphylaxis     Peanuts caused edema / throat swelling / closing     Current Outpatient Medications   Medication Sig   ? acetaminophen (TYLENOL) 500 MG tablet Take 1,000 mg by mouth 3 (three) times a day as needed for pain.   ? enzalutamide (XTANDI) 40 mg cap Take 1 tablet by mouth daily.   ? multivitamin therapeutic tablet Take 1 tablet by mouth daily.   ? aspirin 81 MG EC tablet Take 81 mg by mouth every evening.          ? docoshexanoic acid-eicosapent 500 mg (FISH OIL) 500-100 mg cap capsule Take 500 mg by mouth daily.   ? leuprolide (LUPRON) 30 mg injection Inject 30 mg into the shoulder, thigh, or buttocks every 6 (six) months.      Past Medical History:    Past Medical History:   Diagnosis Date   ? Cholelithiases     pt. unaware   ? CTS (carpal tunnel syndrome)     bilateral   ? HTN (hypertension)    ? IGT (impaired glucose tolerance)    ? Loss of hearing     bilateral hearing aids   ? Prostate cancer (H)    ? Renal insufficiency    ? Renal insufficiency            PHYSICAL EXAMINATION  Vitals:    10/16/19 0700   BP: 124/58   Pulse: 67   Resp: 18   Temp: 98.7  F (37.1  C)   SpO2: 93%   Weight: 145 lb (65.8 kg)       Today on physical exam:     GENERAL: Awake, Alert, not in any form of acute distress, answers questions appropriately, follows simple commands  HEENT: Head is normocephalic with normal hair distribution. No evidence of trauma. Ears: No acute purulent discharge. Eyes: Conjunctivae pink with no scleral jaundice. Nose: Normal mucosa and septum. NECK: Supple with no cervical or  supraclavicular lymphadenopathy. Trachea is midline. [place]  CHEST: No tenderness or deformity, no crepitus  LUNG: dim to auscultation with good chest expansion. There are no crackles or wheezes, normal AP diameter. No shortness of breath or cough noted  BACK: No kyphosis of the thoracic spine. Symmetric, no curvature, ROM normal, no CVA tenderness, no spinal tenderness   CVS: There is good S1  S2, regular rhythm, there are no murmurs, rubs, gallops, or heaves,  2+ pulses symmetric in all extremities.  ABDOMEN: Rounded and soft, nontender to palpation, non distended, no masses, no organomegaly, good bowel sounds, no rebound or guarding, no peritoneal signs.   EXTREMITIES: trace ble pedal edema, Atraumatic. Full range of motion on both upper and lower extremities, there is no tenderness to palpation, no cyanosis or clubbing, no calf tenderness.  SKIN: Warm and dry, no erythema noted.  Skin color, texture, no rashes or lesions.  NEURO/PSYCH: The patient is oriented to person, pleasantly confused, flat affect, socially withdrawn, some delayed responses, weakness.            LABS:   No results found for this or any previous visit (from the past 168 hour(s)).  Results for orders placed or performed in visit on 10/21/15   Basic Metabolic Panel   Result Value Ref Range    Sodium 138 136 - 145 mmol/L    Potassium 4.7 3.5 - 5.0 mmol/L    Chloride 101 98 - 107 mmol/L    CO2 26 22 - 31 mmol/L    Anion Gap, Calculation 11 5 - 18 mmol/L    Glucose 135 (H) 70 - 125 mg/dL    Calcium 10.1 8.5 - 10.5 mg/dL    BUN 25 8 - 28 mg/dL    Creatinine 1.45 (H) 0.70 - 1.30 mg/dL    GFR MDRD Af Amer 56 (L) >60 mL/min/1.73m2    GFR MDRD Non Af Amer 46 (L) >60 mL/min/1.73m2         Lab Results   Component Value Date    WBC 9.0 10/21/2015    HGB 11.2 (L) 10/21/2015    HCT 33.6 (L) 10/21/2015    MCV 89 10/21/2015     10/21/2015       No results found for: [id number]  No results found for: HGBA1C  Lab Results   Component Value Date    INR 1.02  09/22/2015     No results found for: [id number]  No results found for: TSH        ASSESSMENT/PLAN:    1. SALLY on CKD: Lisinopril stopped. Cr improved to 1.71 by 10/13. Imer 3.37, gfr 17. Bmp already ordered for 10/17. Encourage fluids, monitor.   2. Failure to thrive, malnutrition: unclear amount of weight loss over last few months but poor intake at home. Per pcp note on 10/11 had lost 10lbs over last 2 months. Appetite fair here. Admission weight 145lbs. Monitor weights weekly. Dietary consult to follow for preferences and supplement recs.   3. HTN: SBP running 100-140s, 120s today. Overall well controlled off lisinopril. Monitor for now.   4. Dementia: a/o x 1 today. Unclear baseline as no family present. Supportive cares, monitor. No agitation, behaviors overnight. Provide safe environment.  5. Prostate cancer: On lupron injections q4 months, xtandi daily (family to supply). No concerns today.   6. HLD: On aspirin, fish oil. Unclear if aspirin for primary prevention. Would consider risk v benefit but defer to primary provider.   7. Pain, fever: tylenol ordered prn. No complaints today.   7.   Electronically signed by: Vanessa Jensen NP          Total floor/unit time spent 35 with 25 time spent on counseling and coordination of care. Counseling was done regarding hospital course, med changes, management of SALLY on CKD, hypertension management, dementia management and safety concerns, therapy needs. coordinated care with nursing for management of sally on ckd, weakness, dementia, hypertension, lab monitoring.

## 2021-06-19 NOTE — LETTER
Letter by Elissa Diaz CNP at      Author: Elissa Diaz CNP Service: -- Author Type: --    Filed:  Encounter Date: 11/5/2019 Status: Signed         Patient: Federico Epps   MR Number: [id number]   YOB: 1930   Date of Visit: 11/5/2019     [facility]    Facility:   [facility] [place] [[id number]]   Code Status: FULL CODE      CHIEF COMPLAINT/REASON FOR VISIT:  Chief Complaint   Patient presents with   ? Review Of Multiple Medical Conditions       HISTORY:      HPI: Federico is a 89 y.o. male undergoing physical and occupational therapy at [facility]. He is with a history of: prostate cancer, dementia and hypertension.  He was admitted on 10/11/2019, through [place] [facility], with failure to thrive and poor oral intake. CT head revealed no acute pathology.In the ER, he was found to be in SALLY.  He was provided IVF and Lisinopril was discontinued. Creatinine has returned to baseline.  Lactate was elevated and normalized quickly. There was no evidence for acute infectious process.    Today is seen for a routine visit to review multiple medical issues.  He denies chest pain or shortness of breath.  He is moving his bowels and denied urinary issues.  He is being followed closely by dietary and placed on supplements.He had a slightly  abnormal BMP on 10/17 and as of 10/28/19 his creatinine has improved from 1.40 to 1.32 and GRF from 48 to 51. Labs were rechecked on 11/4/19 and his creatinine is now 1.20 and GFR 57.   He contiues to  be oriented to himself and pleasantly confused. It appears a previous weight was incorrect and according to his weights he is down 2.6 pounds since admission (2 weeks).         Past Medical History:   Diagnosis Date   ? Cholelithiases     pt. unaware   ? CTS (carpal tunnel syndrome)     bilateral   ? HTN (hypertension)    ? IGT (impaired glucose tolerance)    ? Loss of hearing     bilateral hearing aids   ? Prostate cancer  (H)    ? Renal insufficiency    ? Renal insufficiency              Family History   Problem Relation Age of Onset   ? Cancer Father    ? Heart disease Father    ? Heart disease Sister    ? Stroke Brother    ? Heart disease Sister      Social History     Socioeconomic History   ? Marital status:      Spouse name: Not on file   ? Number of children: Not on file   ? Years of education: Not on file   ? Highest education level: Not on file   Occupational History   ? Not on file   Social Needs   ? Financial resource strain: Not on file   ? Food insecurity:     Worry: Not on file     Inability: Not on file   ? Transportation needs:     Medical: Not on file     Non-medical: Not on file   Tobacco Use   ? Smoking status: Never Smoker   ? Smokeless tobacco: Never Used   Substance and Sexual Activity   ? Alcohol use: Yes     Comment: once a month or less   ? Drug use: No   ? Sexual activity: Not on file   Lifestyle   ? Physical activity:     Days per week: Not on file     Minutes per session: Not on file   ? Stress: Not on file   Relationships   ? Social connections:     Talks on phone: Not on file     Gets together: Not on file     Attends Christianity service: Not on file     Active member of club or organization: Not on file     Attends meetings of clubs or organizations: Not on file     Relationship status: Not on file   ? Intimate partner violence:     Fear of current or ex partner: Not on file     Emotionally abused: Not on file     Physically abused: Not on file     Forced sexual activity: Not on file   Other Topics Concern   ? Not on file   Social History Narrative   ? Not on file         Review of Systems   Constitutional: Positive for activity change and appetite change. Negative for chills, fatigue and fever.        As current diagnosis of failure to thrive being followed closely by dietary.   HENT: Negative for congestion and sore throat.    Eyes: Negative for visual disturbance.   Respiratory: Negative for  cough, shortness of breath and wheezing.    Cardiovascular: Negative for chest pain and leg swelling.   Gastrointestinal: Negative for abdominal distention, abdominal pain, constipation, diarrhea and nausea.   Genitourinary: Negative for dysuria.   Musculoskeletal: Negative for arthralgias and myalgias.   Skin: Negative for color change, rash and wound.   Neurological: Negative for dizziness, weakness and numbness.   Psychiatric/Behavioral: Negative for agitation, behavioral problems and sleep disturbance.       Vitals:    11/05/19 0805   BP: 116/64   Pulse: 76   Resp: 18   Temp: 97.2  F (36.2  C)   SpO2: 97%   Weight: 142 lb 12.8 oz (64.8 kg)       Physical Exam  Constitutional:       Appearance: He is well-developed.      Comments: Pleasant gentleman in no acute distress   HENT:      Head: Normocephalic.   Eyes:      Conjunctiva/sclera: Conjunctivae normal.   Neck:      Musculoskeletal: Normal range of motion.   Cardiovascular:      Rate and Rhythm: Normal rate and regular rhythm.      Heart sounds: Normal heart sounds. No murmur.   Pulmonary:      Effort: No respiratory distress.      Breath sounds: Normal breath sounds. No wheezing or rales.   Abdominal:      General: Bowel sounds are normal. There is no distension.      Palpations: Abdomen is soft.      Tenderness: There is no tenderness.   Musculoskeletal: Normal range of motion.   Skin:     General: Skin is warm.   Neurological:      Mental Status: He is alert and oriented to person, place, and time.   Psychiatric:         Behavior: Behavior normal.           LABS:   Recent Results (from the past 240 hour(s))   Basic Metabolic Panel   Result Value Ref Range    Sodium 136 136 - 145 mmol/L    Potassium 4.4 3.5 - 5.0 mmol/L    Chloride 103 98 - 107 mmol/L    CO2 28 22 - 31 mmol/L    Anion Gap, Calculation 5 5 - 18 mmol/L    Glucose 103 70 - 125 mg/dL    Calcium 9.2 8.5 - 10.5 mg/dL    BUN 33 (H) 8 - 28 mg/dL    Creatinine 1.32 (H) 0.70 - 1.30 mg/dL    GFR MDRD  Af Amer >60 >60 mL/min/1.73m2    GFR MDRD Non Af Amer 51 (L) >60 mL/min/1.73m2   Basic Metabolic Panel   Result Value Ref Range    Sodium 136 136 - 145 mmol/L    Potassium 5.0 3.5 - 5.0 mmol/L    Chloride 106 98 - 107 mmol/L    CO2 21 (L) 22 - 31 mmol/L    Anion Gap, Calculation 9 5 - 18 mmol/L    Glucose 91 70 - 125 mg/dL    Calcium 9.2 8.5 - 10.5 mg/dL    BUN 33 (H) 8 - 28 mg/dL    Creatinine 1.20 0.70 - 1.30 mg/dL    GFR MDRD Af Amer >60 >60 mL/min/1.73m2    GFR MDRD Non Af Amer 57 (L) >60 mL/min/1.73m2        Current Outpatient Medications   Medication Sig   ? acetaminophen (TYLENOL) 500 MG tablet Take 1,000 mg by mouth 3 (three) times a day as needed for pain.   ? aspirin 81 MG EC tablet Take 81 mg by mouth every evening.          ? docoshexanoic acid-eicosapent 500 mg (FISH OIL) 500-100 mg cap capsule Take 500 mg by mouth daily.   ? enzalutamide (XTANDI) 40 mg cap Take 1 tablet by mouth daily.   ? leuprolide (LUPRON) 30 mg injection Inject 30 mg into the shoulder, thigh, or buttocks every 6 (six) months.    ? multivitamin therapeutic tablet Take 1 tablet by mouth daily.   .ASSESSMENT:      ICD-10-CM    1. Weakness R53.1    2. Essential hypertension I10    3. Failure to thrive in adult R62.7    4. Late onset Alzheimer's disease without behavioral disturbance (H) G30.1     F02.80        PLAN:    Weakness PT OT    Hypertension currently off of his lisinopril and blood pressure stable at 116/64    Dementia noted to be oriented to self only pleasantly confused.  Provide a safe and comfortable environment    Failure to thrive being followed closely by dietary. currently showing a 2.6 pound weight loss in the last 14 days, on supplements.     Acute kidney injury last BMP was done 11/4/19- greatly improved- see above.     Electronically signed by: Elissa Diaz CNP

## 2021-06-20 NOTE — LETTER
Letter by Elissa Diaz CNP at      Author: Elissa Diaz CNP Service: -- Author Type: --    Filed:  Encounter Date: 3/4/2020 Status: (Other)         Patient: Federico Epps   MR Number: [id number]   YOB: 1930   Date of Visit: 3/4/2020     [facility]    Facility:   [facility] [place] [[id number]]   Code Status: DNR      CHIEF COMPLAINT/REASON FOR VISIT:  Chief Complaint   Patient presents with   ? Review Of Multiple Medical Conditions       HISTORY:      HPI: Federico is a 89 y.o. male undergoing physical and occupational therapy at [facility] transitional care unit.  He is with past medical history significant for hypertension, prostate cancer, dementia,  renal insufficiency stage 3, who presented to the ER after sustaining a fall a few days prior at his care facility. He was brought to the ER on 2/18 for reports of leg weakness. Of note he is essentially wheelchair bound. He was found to have a UTI and placed on rocephin and will complete a course of Ceftin on  2/28/20.     Today he is seen for a routine medical visit to review multiple medical issues.  His left  fifth toe  is healing well and was without S/S Infection. Currently he is getting foot soaks and bacitracin.      He denied chest pain or shortness of breath, denied constipation or diarrhea, he denied any pain or burning. He completed  Ceftin on  2/28/2020 for UTI.  He was alert and oriented to self only.  Vital signs are stable. His stool, is beginning to form     Past Medical History:   Diagnosis Date   ? Cholelithiases     pt. unaware   ? CTS (carpal tunnel syndrome)     bilateral   ? HTN (hypertension)    ? IGT (impaired glucose tolerance)    ? Loss of hearing     bilateral hearing aids   ? Prostate cancer (H)    ? Renal insufficiency    ? Renal insufficiency              Family History   Problem Relation Age of Onset   ? Cancer Father    ? Heart disease Father    ? Heart  disease Sister    ? Stroke Brother    ? Heart disease Sister      Social History     Socioeconomic History   ? Marital status:      Spouse name: Not on file   ? Number of children: Not on file   ? Years of education: Not on file   ? Highest education level: Not on file   Occupational History   ? Not on file   Social Needs   ? Financial resource strain: Not on file   ? Food insecurity:     Worry: Not on file     Inability: Not on file   ? Transportation needs:     Medical: Not on file     Non-medical: Not on file   Tobacco Use   ? Smoking status: Never Smoker   ? Smokeless tobacco: Never Used   Substance and Sexual Activity   ? Alcohol use: Yes     Comment: once a month or less   ? Drug use: No   ? Sexual activity: Not on file   Lifestyle   ? Physical activity:     Days per week: Not on file     Minutes per session: Not on file   ? Stress: Not on file   Relationships   ? Social connections:     Talks on phone: Not on file     Gets together: Not on file     Attends Zoroastrian service: Not on file     Active member of club or organization: Not on file     Attends meetings of clubs or organizations: Not on file     Relationship status: Not on file   ? Intimate partner violence:     Fear of current or ex partner: Not on file     Emotionally abused: Not on file     Physically abused: Not on file     Forced sexual activity: Not on file   Other Topics Concern   ? Not on file   Social History Narrative   ? Not on file     Limited due to cognition chart also reviewed and spoke with nursing    Review of Systems   Constitutional: Positive for fatigue. Negative for activity change, appetite change, chills and fever.   HENT: Negative for congestion and sore throat.    Eyes: Negative for visual disturbance.   Respiratory: Negative for cough, shortness of breath and wheezing.    Cardiovascular: Negative for chest pain and leg swelling.   Gastrointestinal: Negative for abdominal distention, abdominal pain, constipation,  diarrhea and nausea.   Genitourinary: Negative for dysuria.        Completed treatment  For a  UTI   Musculoskeletal: Negative for arthralgias and myalgias.   Skin: Negative for color change, rash and wound.   Neurological: Negative for dizziness, weakness and numbness.   Psychiatric/Behavioral: Negative for agitation, behavioral problems and sleep disturbance.       Vitals:    03/04/20 0741   BP: 138/76   Pulse: 84   Resp: 20   Temp: 96.8  F (36  C)   SpO2: 96%   Weight: 145 lb 1.6 oz (65.8 kg)       Physical Exam  Constitutional:       Appearance: He is well-developed.      Comments: Pleasantly confused gentleman in no acute distress   HENT:      Head: Normocephalic.   Eyes:      Conjunctiva/sclera: Conjunctivae normal.   Neck:      Musculoskeletal: Normal range of motion.   Cardiovascular:      Rate and Rhythm: Normal rate and regular rhythm.      Heart sounds: Normal heart sounds. No murmur.   Pulmonary:      Effort: No respiratory distress.      Breath sounds: Normal breath sounds. No wheezing or rales.   Abdominal:      General: Bowel sounds are normal. There is no distension.      Palpations: Abdomen is soft.      Tenderness: There is no abdominal tenderness.   Musculoskeletal: Normal range of motion.   Skin:     General: Skin is warm.   Neurological:      Mental Status: He is alert and oriented to person, place, and time.   Psychiatric:         Behavior: Behavior normal.           LABS:   No results found for this or any previous visit (from the past 240 hour(s)).  Current Outpatient Medications   Medication Sig Note   ? acetaminophen (TYLENOL) 500 MG tablet Take 1,000 mg by mouth 3 (three) times a day as needed for pain.    ? aspirin 81 MG EC tablet Take 81 mg by mouth every evening.           ? dimethicone/zinc oxide (GEOVANNI PROTECT TOP) Apply 1 application topically. Apple to affected area daily skin lesion    ? docoshexanoic acid-eicosapent 500 mg (FISH OIL) 500-100 mg cap capsule Take 500 mg by mouth  daily.    ? enzalutamide (XTANDI) 40 mg cap Take 1 tablet by mouth daily. 2/24/2020: On hold until seen by oncology    ? leuprolide (LUPRON) 30 mg injection Inject 30 mg into the shoulder, thigh, or buttocks every 6 (six) months.  2/24/2020: On hold in the TCU- due next 3/20/20   ? multivitamin therapeutic tablet Take 1 tablet by mouth daily.      ASSESSMENT:      ICD-10-CM    1. Essential hypertension I10    2. Failure to thrive in adult R62.7    3. Weakness R53.1        PLAN:      Left fifth toe nail damaged trimmed per staff, twice daily foot soaks with Epson salt and bacitracin until healed    Weakness PT OT    Malnutrition dietary to follow    Prostate cancer Xtandi and leuprolide currently on hold in the transitional care unit patient to follow-up with urology    UTI completed  Ceftin  on 2/28/2020    Electronically signed by: Elissa Diaz CNP

## 2021-06-20 NOTE — LETTER
Letter by Yahaira Carmen MD at      Author: Yahaira Carmen MD Service: -- Author Type: --    Filed:  Encounter Date: 4/2/2020 Status: (Other)         Patient: Federico Epps   MR Number: [id number]   YOB: 1930   Date of Visit: 4/2/2020     [facility]    Facility:   [facility] [[id number]]   Code Status: DNR  PCP: Jaime Watkins MD   Phone: [phone]   Fax: [phone]      CHIEF COMPLAINT/REASON FOR VISIT:  Chief Complaint   Patient presents with   ? Discharge Summary     [facility] TCU 2/21/20-4/3/20.       HISTORY COURSE:    FROM TCU HPI:   Federico is a 89 y.o. male with hx of prostate cancer, HTN, dementia, presented to the hospital on 2/18/20 with info as noted below.     Hospital Course:   Federico Epps is a 89 y.o. male with a history of HYPERTENSION, impaired glucose tolerance, surgery for prostate cancer, dementia and CHRONIC KIDNEY DISEASE stage 3. He is essentially wheelchair-bound and has what appears to be some chronic sores on his bottom. He had a fall at his care facility several days before this current admission. He was brought to our ER 2/18 because he complained of leg weakness. Once here his urine was shown to have over 100 WBC's and many bacteria. He was admitted for observation and placed on Rocephin. On 2/18 he was evaluated by PT, who felt he would benefit from some ongoing therapies after discharge. I will have him finish a course of Ceftin. His assisted living facility says his function was marginal before his UTI and they feel he should have some TCU rehabilitation before he attempts to return to them. Medication regimen changes: the only medication change was the addition of Ceftin for a UTI. Overall stabilized and discharged to TCU on 2/21/20 for PT, OT, nursing cares, medical management and monitoring.     TCU Course:  There were no complications during his TCU stay. He has slow progress with therapies,  cognitive impairment, overall baseline weakness. He completed abx for UTI without incident or recurrence. He had no new medication changes. He is ready to discharge back to [facility] tomorrow with therapies set up.       PHYSICAL EXAM:   General: Patient is alert elderly male, no distress.   Vitals: /60, Temp 98.5, Pulse 74, RR 18, O2 sat 98%RA.  HEENT: Head is NCAT. Eyes show no injection or icterus. Nares negative. Oropharynx well hydrated.  : Deferred.  Extremities: No edema is noted.  Musculoskeletal: Age related degen changes.   Skin: No rashes.   Psych: Dementia.      Labs:  Results for orders placed or performed in visit on 11/04/19   Basic Metabolic Panel   Result Value Ref Range    Sodium 136 136 - 145 mmol/L    Potassium 5.0 3.5 - 5.0 mmol/L    Chloride 106 98 - 107 mmol/L    CO2 21 (L) 22 - 31 mmol/L    Anion Gap, Calculation 9 5 - 18 mmol/L    Glucose 91 70 - 125 mg/dL    Calcium 9.2 8.5 - 10.5 mg/dL    BUN 33 (H) 8 - 28 mg/dL         MEDICATION LIST:  Current Outpatient Medications   Medication Sig   ? acetaminophen (TYLENOL) 500 MG tablet Take 1,000 mg by mouth 3 (three) times a day as needed for pain.   ? aspirin 81 MG EC tablet Take 81 mg by mouth every evening.          ? dimethicone/zinc oxide (GEOVANNI PROTECT TOP) Apply 1 application topically. Apple to affected area daily skin lesion   ? docoshexanoic acid-eicosapent 500 mg (FISH OIL) 500-100 mg cap capsule Take 500 mg by mouth daily.   ? enzalutamide (XTANDI) 40 mg cap Take 1 tablet by mouth daily.   ? leuprolide (LUPRON) 30 mg injection Inject 30 mg into the shoulder, thigh, or buttocks every 6 (six) months.    ? multivitamin therapeutic tablet Take 1 tablet by mouth daily.       DISCHARGE DIAGNOSIS:  1. UTI. Completed abx.   2. HTN. Not on BP meds. BPs satisfactory.   3. Dementia. Resides in [facility].  4. CKD. Stage 3.   5. Hx prostate cancer.   6. Weakness. Baseline poor functional ability.     Total time greater than 30 minutes  discharge coordination.      MEDICAL EQUIPMENT NEEDS:  No new needs.      DISCHARGE PLAN/FACE TO FACE:  I certify that services are/were furnished while this patient was under the care of a physician and that a physician or an allowed non-physician practitioner (NPP), had a face-to-face encounter that meets the physician face-to-face encounter requirements. The encounter was in whole, or in part, related to the primary reason for home health. The patient is confined to his/her home and needs intermittent skilled nursing, physical therapy, speech-language pathology, or the continued need for occupational therapy. A plan of care has been established by a physician and is periodically reviewed by a physician.    Date of Face-to-Face Encounter: 4/2/20.    I certify that, based on my findings, the following services are medically necessary home health services: PT, OT, HHA, RN.    My clinical findings support the need for the above skilled services because: Overall weakness and deconditioning, advanced age and dementia, recent treatment for UTI, hx of prostate cancer. Needs additional therapies as he returns home to re-adjust to surroundings. Nurse for clinical assessments, aide to assist with cares.     This patient is homebound because: Too strenuous to leave the home.     The patient is, or has been, under my care and I have initiated the establishment of the plan of care. This patient will be followed by a physician who will periodically review the plan of care.    Schedule follow up visit with primary care provider within 7 days to reestablish care.    Electronically signed by: Yahaira Carmen MD

## 2021-06-20 NOTE — LETTER
Letter by Elissa Diaz CNP at      Author: Elissa Diaz CNP Service: -- Author Type: --    Filed:  Encounter Date: 3/17/2020 Status: (Other)         Patient: Federico Epps   MR Number: [id number]   YOB: 1930   Date of Visit: 3/17/2020     [facility]    Facility:   [facility] [[id number]]   Code Status: DNR      CHIEF COMPLAINT/REASON FOR VISIT:  Chief Complaint   Patient presents with   ? Review Of Multiple Medical Conditions       HISTORY:      HPI: Federico is a 89 y.o. male undergoing physical and occupational therapy at [facility] transitional care unit.  He is with past medical history significant for hypertension, prostate cancer, dementia,  renal insufficiency stage 3, who presented to the ER after sustaining a fall a few days prior at his care facility. He was brought to the ER on 2/18 for reports of leg weakness. Of note he is essentially wheelchair bound. He was found to have a UTI and placed on rocephin and will complete a course of Ceftin on  2/28/20.     Today he is seen for a routine medical visit and for reports of current buttock cream ineffective, He continues to have excoriation and was changed to his  Previous home cream.    His fifth  toe is healed      He denied chest pain or shortness of breath, denied constipation or diarrhea, he denied any pain or burning. He completed  Ceftin on  2/28/2020 for UTI.  He is alert and oriented to self only.  Vital signs are stable.  He is mostly wheelchair-bound he does continue to have left leg weakness.      Past Medical History:   Diagnosis Date   ? Cholelithiases     pt. unaware   ? CTS (carpal tunnel syndrome)     bilateral   ? HTN (hypertension)    ? IGT (impaired glucose tolerance)    ? Loss of hearing     bilateral hearing aids   ? Prostate cancer (H)    ? Renal insufficiency    ? Renal insufficiency              Family History   Problem Relation Age of Onset   ?  Cancer Father    ? Heart disease Father    ? Heart disease Sister    ? Stroke Brother    ? Heart disease Sister      Social History     Socioeconomic History   ? Marital status:      Spouse name: Not on file   ? Number of children: Not on file   ? Years of education: Not on file   ? Highest education level: Not on file   Occupational History   ? Not on file   Social Needs   ? Financial resource strain: Not on file   ? Food insecurity     Worry: Not on file     Inability: Not on file   ? Transportation needs     Medical: Not on file     Non-medical: Not on file   Tobacco Use   ? Smoking status: Never Smoker   ? Smokeless tobacco: Never Used   Substance and Sexual Activity   ? Alcohol use: Yes     Comment: once a month or less   ? Drug use: No   ? Sexual activity: Not on file   Lifestyle   ? Physical activity     Days per week: Not on file     Minutes per session: Not on file   ? Stress: Not on file   Relationships   ? Social connections     Talks on phone: Not on file     Gets together: Not on file     Attends Restorationist service: Not on file     Active member of club or organization: Not on file     Attends meetings of clubs or organizations: Not on file     Relationship status: Not on file   ? Intimate partner violence     Fear of current or ex partner: Not on file     Emotionally abused: Not on file     Physically abused: Not on file     Forced sexual activity: Not on file   Other Topics Concern   ? Not on file   Social History Narrative   ? Not on file     Limited due to cognition chart also reviewed and spoke with nursing    Review of Systems   Constitutional: Positive for fatigue. Negative for activity change, appetite change, chills and fever.   HENT: Negative for congestion and sore throat.    Eyes: Negative for visual disturbance.   Respiratory: Negative for cough, shortness of breath and wheezing.    Cardiovascular: Negative for chest pain and leg swelling.   Gastrointestinal: Negative for abdominal  distention, abdominal pain, constipation, diarrhea and nausea.   Genitourinary: Negative for dysuria.        Completed treatment  For a  UTI   Musculoskeletal: Negative for arthralgias and myalgias.   Skin: Negative for color change, rash and wound.   Neurological: Negative for dizziness, weakness and numbness.   Psychiatric/Behavioral: Negative for agitation, behavioral problems and sleep disturbance.       Vitals:    03/17/20 0951   BP: 126/70   Pulse: 80   Resp: 20   Temp: 98  F (36.7  C)   SpO2: 96%   Weight: 143 lb (64.9 kg)       Physical Exam  Constitutional:       Appearance: He is well-developed.      Comments: Pleasantly confused gentleman in no acute distress   HENT:      Head: Normocephalic.   Eyes:      Conjunctiva/sclera: Conjunctivae normal.   Neck:      Musculoskeletal: Normal range of motion.   Cardiovascular:      Rate and Rhythm: Normal rate and regular rhythm.      Heart sounds: Normal heart sounds. No murmur.   Pulmonary:      Effort: No respiratory distress.      Breath sounds: Normal breath sounds. No wheezing or rales.   Abdominal:      General: Bowel sounds are normal. There is no distension.      Palpations: Abdomen is soft.      Tenderness: There is no abdominal tenderness.   Musculoskeletal: Normal range of motion.   Skin:     General: Skin is warm.      Comments: Excoriated bottom.    Neurological:      Mental Status: He is alert and oriented to person, place, and time.   Psychiatric:         Behavior: Behavior normal.           LABS:   No results found for this or any previous visit (from the past 240 hour(s)).  Current Outpatient Medications   Medication Sig Note   ? acetaminophen (TYLENOL) 500 MG tablet Take 1,000 mg by mouth 3 (three) times a day as needed for pain.    ? aspirin 81 MG EC tablet Take 81 mg by mouth every evening.           ? dimethicone/zinc oxide (GEOVANNI PROTECT TOP) Apply 1 application topically. Apple to affected area daily skin lesion    ? docoshexanoic  acid-eicosapent 500 mg (FISH OIL) 500-100 mg cap capsule Take 500 mg by mouth daily.    ? enzalutamide (XTANDI) 40 mg cap Take 1 tablet by mouth daily. 2/24/2020: On hold until seen by oncology    ? leuprolide (LUPRON) 30 mg injection Inject 30 mg into the shoulder, thigh, or buttocks every 6 (six) months.  2/24/2020: On hold in the TCU- due next 3/20/20   ? multivitamin therapeutic tablet Take 1 tablet by mouth daily.      ASSESSMENT:      ICD-10-CM    1. Weakness  R53.1    2. Failure to thrive in adult  R62.7    3. Late onset Alzheimer's disease without behavioral disturbance (H)  G30.1     F02.80    4. Pleural effusion  J90        PLAN:      Left fifth toe nail damaged healed     Weakness PT OT    Malnutrition dietary to follow    Prostate cancer Xtandi and leuprolide currently on hold in the transitional care unit patient to follow-up with urology    UTI completed  Ceftin  on 2/28/2020    Buttock Excoriation- resume prior home cream    Electronically signed by: Elissa Diaz CNP

## 2021-06-20 NOTE — LETTER
Letter by Yahaira Carmen MD at      Author: Yahaira Carmen MD Service: -- Author Type: --    Filed:  Encounter Date: 2/25/2020 Status: (Other)         Patient: Federico Epps   MR Number: [id number]   YOB: 1930   Date of Visit: 2/25/2020     [facility]      Facility:    [facility] [[id number]]  Code Status: DNR       Chief Complaint/Reason for Visit:  Chief Complaint   Patient presents with   ? H & P     Admit note to TCU for UTI.       HPI:   Federico is a 89 y.o. male with hx of prostate cancer, HTN, dementia, presented to the hospital on 2/18/20 with info as noted below.     Hospital Course:   Federico Epps is a 89 y.o. male with a history of HYPERTENSION, impaired glucose tolerance, surgery for prostate cancer, dementia and CHRONIC KIDNEY DISEASE stage 3. He is essentially wheelchair-bound and has what appears to be some chronic sores on his bottom. He had a fall at his care facility several days before this current admission. He was brought to our ER 2/18 because he complained of leg weakness. Once here his urine was shown to have over 100 WBC's and many bacteria. He was admitted for observation and placed on Rocephin. On 2/18 he was evaluated by PT, who felt he would benefit from some ongoing therapies after discharge. I will have him finish a course of Ceftin. His assisted living facility says his function was marginal before his UTI and they feel he should have some TCU rehabilitation before he attempts to return to them.    Medication regimen changes: the only medication change was the addition of Ceftin for a UTI.     Overall stabilized and discharged to TCU on 2/21/20 for PT, OT, nursing cares, medical management and monitoring.     Today:  Unable to do accurate ROS due to dementia. He denies any pain or complaints. Nursing reports no concerns. No hypoxia or need for oxygen. No fever or cough noted. Appetite is fair. No  vomiting, diarrhea or constipation. No complaints of abdominal pain. Unaware of any urinary problems. He has completed Ceftin PO after IV abx in the hospital. He denies shortness of breath or chest pain. [place] is [place], no reported new vision problems.       Past Medical History:  Past Medical History:   Diagnosis Date   ? Cholelithiases     pt. unaware   ? CTS (carpal tunnel syndrome)     bilateral   ? HTN (hypertension)    ? IGT (impaired glucose tolerance)    ? Loss of hearing     bilateral hearing aids   ? Prostate cancer (H)    ? Renal insufficiency    ? Renal insufficiency            Surgical History:  Past Surgical History:   Procedure Laterality Date   ? CATARACT EXTRACTION Bilateral    ? HERNIA REPAIR Left    ? WA REVISE MEDIAN N/CARPAL TUNNEL SURG Right 3/31/2015    Procedure: RIGHT CARPAL TUNNEL RELEASE;  Surgeon: Henry Mora MD;  Location: [facility];  Service: Orthopedics   ? PROSTATECTOMY  2010    cryoablation   ? TOTAL HIP ARTHROPLASTY Bilateral        Family History:   Family History   Problem Relation Age of Onset   ? Cancer Father    ? Heart disease Father    ? Heart disease Sister    ? Stroke Brother    ? Heart disease Sister        Social History:    Social History     Socioeconomic History   ? Marital status:      Spouse name: Not on file   ? Number of children: Not on file   ? Years of education: Not on file   ? Highest education level: Not on file   Occupational History   ? Not on file   Social Needs   ? Financial resource strain: Not on file   ? Food insecurity     Worry: Not on file     Inability: Not on file   ? Transportation needs     Medical: Not on file     Non-medical: Not on file   Tobacco Use   ? Smoking status: Never Smoker   ? Smokeless tobacco: Never Used   Substance and Sexual Activity   ? Alcohol use: Yes     Comment: once a month or less   ? Drug use: No   ? Sexual activity: Not on file   Lifestyle   ? Physical activity     Days per week: Not on file     Minutes  per session: Not on file   ? Stress: Not on file   Relationships   ? Social connections     Talks on phone: Not on file     Gets together: Not on file     Attends Pentecostalism service: Not on file     Active member of club or organization: Not on file     Attends meetings of clubs or organizations: Not on file     Relationship status: Not on file   ? Intimate partner violence     Fear of current or ex partner: Not on file     Emotionally abused: Not on file     Physically abused: Not on file     Forced sexual activity: Not on file   Other Topics Concern   ? Not on file   Social History Narrative   ? Not on file       Medications:  Current Outpatient Medications   Medication Sig Note   ? acetaminophen (TYLENOL) 500 MG tablet Take 1,000 mg by mouth 3 (three) times a day as needed for pain.    ? aspirin 81 MG EC tablet Take 81 mg by mouth every evening.           ? dimethicone/zinc oxide (GEOVANNI PROTECT TOP) Apply 1 application topically. Apple to affected area daily skin lesion    ? docoshexanoic acid-eicosapent 500 mg (FISH OIL) 500-100 mg cap capsule Take 500 mg by mouth daily.    ? enzalutamide (XTANDI) 40 mg cap Take 1 tablet by mouth daily. 2/24/2020: On hold until seen by oncology    ? leuprolide (LUPRON) 30 mg injection Inject 30 mg into the shoulder, thigh, or buttocks every 6 (six) months.  2/24/2020: On hold in the TCU- due next 3/20/20   ? multivitamin therapeutic tablet Take 1 tablet by mouth daily.        Allergies:  Allergies   Allergen Reactions   ? Other Food Allergy Anaphylaxis     Peanuts caused edema / throat swelling / closing        Review of Systems:  Pertinent items are noted in HPI.      Physical Exam:   General: Patient is alert elderly male, no distress.   Vitals: /87, Temp 98.3, Pulse 84, RR 16, O2 sat 94%RA.  HEENT: Head is NCAT. Eyes show no injection or icterus. Nares negative. Oropharynx well hydrated.  Neck: Supple. No tenderness or adenopathy. No JVD.  Lungs: Clear bilaterally. No  wheezes.  Cardiovascular: Regular rate and rhythm, normal S1. S2.  Back: No spinal or CVA tenderness.  Abdomen: Soft, no tenderness on exam. Bowel sounds present. No guarding rebound or rigidity.  : Deferred.  Extremities: No edema is noted.  Musculoskeletal: Age related degen changes.   Skin: No rashes.   Psych: Difficult to assess.      Labs:  Results for orders placed or performed in visit on 11/04/19   Basic Metabolic Panel   Result Value Ref Range    Sodium 136 136 - 145 mmol/L    Potassium 5.0 3.5 - 5.0 mmol/L    Chloride 106 98 - 107 mmol/L    CO2 21 (L) 22 - 31 mmol/L    Anion Gap, Calculation 9 5 - 18 mmol/L    Glucose 91 70 - 125 mg/dL    Calcium 9.2 8.5 - 10.5 mg/dL    BUN 33 (H) 8 - 28 mg/dL    Creatinine 1.20 0.70 - 1.30 mg/dL    GFR MDRD Af Amer >60 >60 mL/min/1.73m2    GFR MDRD Non Af Amer 57 (L) >60 mL/min/1.73m2       Assessment/Plan:  1. Weakness and deconditioning. Here for therapy, strengthening. Baseline poor functional ability.  2. UTI. Completed abx. No current sx of concern.  3. HTN. Not on meds. BPs satisfactory. Continue to monitor.  4. Dementia. Confused at baseline. No family here at present. SW involved for disposition planning.  5. CKD. Stage 3. Check labs as warranted.  6. Hx prostate cancer. Meds on hold while in TCU.   7. Code status is DNR.            Electronically signed by: Yahaira Carmen MD

## 2021-06-20 NOTE — LETTER
Letter by Elissa Diaz CNP at      Author: Elissa Diaz CNP Service: -- Author Type: --    Filed:  Encounter Date: 2/27/2020 Status: (Other)         Patient: Federico Epps   MR Number: [id number]   YOB: 1930   Date of Visit: 2/27/2020     [facility]    Facility:   [facility] [[id number]]   Code Status: DNR      CHIEF COMPLAINT/REASON FOR VISIT:  Chief Complaint   Patient presents with   ? Review Of Multiple Medical Conditions       HISTORY:      HPI: Federico is a 89 y.o. male undergoing physical and occupational therapy at [facility] transitional care unit.  He is with past medical history significant for hypertension, prostate cancer, dementia,  renal insufficiency stage 3, who presented to the ER after sustaining a fall a few days prior at his care facility. He was brought to the ER on 2/18 for reports of leg weakness. Of note he is essentially wheelchair bound. He was found to have a UTI and placed on rocephin and will complete a course of Ceftin on  2/28/20.     Today he is seen for reports of a Sophie Anita nail on his fifth left toe.  It appears he bumped his foot and his toenail was hanging off.  It was trimmed by staff.  Foot soaks and bacitracin were ordered.  He denied chest pain or shortness of breath, denied constipation or diarrhea, he denied any pain or burning however he is being treated with Ceftin until 2/28/2020 for UTI.  He was alert and oriented to self only.  Vital signs are stable.    Past Medical History:   Diagnosis Date   ? Cholelithiases     pt. unaware   ? CTS (carpal tunnel syndrome)     bilateral   ? HTN (hypertension)    ? IGT (impaired glucose tolerance)    ? Loss of hearing     bilateral hearing aids   ? Prostate cancer (H)    ? Renal insufficiency    ? Renal insufficiency              Family History   Problem Relation Age of Onset   ? Cancer Father    ? Heart disease Father    ? Heart disease  Sister    ? Stroke Brother    ? Heart disease Sister      Social History     Socioeconomic History   ? Marital status:      Spouse name: Not on file   ? Number of children: Not on file   ? Years of education: Not on file   ? Highest education level: Not on file   Occupational History   ? Not on file   Social Needs   ? Financial resource strain: Not on file   ? Food insecurity:     Worry: Not on file     Inability: Not on file   ? Transportation needs:     Medical: Not on file     Non-medical: Not on file   Tobacco Use   ? Smoking status: Never Smoker   ? Smokeless tobacco: Never Used   Substance and Sexual Activity   ? Alcohol use: Yes     Comment: once a month or less   ? Drug use: No   ? Sexual activity: Not on file   Lifestyle   ? Physical activity:     Days per week: Not on file     Minutes per session: Not on file   ? Stress: Not on file   Relationships   ? Social connections:     Talks on phone: Not on file     Gets together: Not on file     Attends [employer] service: Not on file     Active member of club or organization: Not on file     Attends meetings of clubs or organizations: Not on file     Relationship status: Not on file   ? Intimate partner violence:     Fear of current or ex partner: Not on file     Emotionally abused: Not on file     Physically abused: Not on file     Forced sexual activity: Not on file   Other Topics Concern   ? Not on file   Social History Narrative   ? Not on file     Limited due to cognition chart also reviewed and spoke with nursing    Review of Systems   Constitutional: Positive for fatigue. Negative for activity change, appetite change, chills and fever.   HENT: Negative for congestion and sore throat.    Eyes: Negative for visual disturbance.   Respiratory: Negative for cough, shortness of breath and wheezing.    Cardiovascular: Negative for chest pain and leg swelling.   Gastrointestinal: Negative for abdominal distention, abdominal pain, constipation, diarrhea and  nausea.   Genitourinary: Negative for dysuria.        Currently being treated for UTI   Musculoskeletal: Negative for arthralgias and myalgias.   Skin: Negative for color change, rash and wound.   Neurological: Negative for dizziness, weakness and numbness.   Psychiatric/Behavioral: Negative for agitation, behavioral problems and sleep disturbance.       Vitals:    02/27/20 0840   BP: 125/71   Pulse: 78   Resp: 16   Temp: 98.7  F (37.1  C)   SpO2: 98%   Weight: 146 lb (66.2 kg)       Physical Exam  Constitutional:       Appearance: He is well-developed.      Comments: Pleasantly confused gentleman in no acute distress   HENT:      Head: Normocephalic.   Eyes:      Conjunctiva/sclera: Conjunctivae normal.   Neck:      Musculoskeletal: Normal range of motion.   Cardiovascular:      Rate and Rhythm: Normal rate and regular rhythm.      Heart sounds: Normal heart sounds. No murmur.   Pulmonary:      Effort: No respiratory distress.      Breath sounds: Normal breath sounds. No wheezing or rales.   Abdominal:      General: Bowel sounds are normal. There is no distension.      Palpations: Abdomen is soft.      Tenderness: There is no abdominal tenderness.   Musculoskeletal: Normal range of motion.   Skin:     General: Skin is warm.   Neurological:      Mental Status: He is alert and oriented to person, place, and time.   Psychiatric:         Behavior: Behavior normal.           LABS:   No results found for this or any previous visit (from the past 240 hour(s)).  Current Outpatient Medications   Medication Sig Note   ? acetaminophen (TYLENOL) 500 MG tablet Take 1,000 mg by mouth 3 (three) times a day as needed for pain.    ? aspirin 81 MG EC tablet Take 81 mg by mouth every evening.           ? cefuroxime (CEFTIN) 500 MG tablet Take 500 mg by mouth 2 (two) times a day.    ? dimethicone/zinc oxide (GEOVANNI PROTECT TOP) Apply 1 application topically. Apple to affected area daily skin lesion    ? docoshexanoic acid-eicosapent 500  mg (FISH OIL) 500-100 mg cap capsule Take 500 mg by mouth daily.    ? enzalutamide (XTANDI) 40 mg cap Take 1 tablet by mouth daily. 2/24/2020: On hold until seen by oncology    ? leuprolide (LUPRON) 30 mg injection Inject 30 mg into the shoulder, thigh, or buttocks every 6 (six) months.  2/24/2020: On hold in the TCU- due next 3/20/20   ? multivitamin therapeutic tablet Take 1 tablet by mouth daily.      ASSESSMENT:      ICD-10-CM    1. Failure to thrive in adult R62.7    2. Late onset Alzheimer's disease without behavioral disturbance (H) G30.1     F02.80        PLAN:      Left fifth toe nail damaged trimmed per staff, twice daily foot soaks with Epson salt and bacitracin until healed    Weakness PT OT    Malnutrition dietary to follow    Prostate cancer Xtandi and leuprolide currently on hold in the transitional care unit patient to follow-up with urology    UTI currently on Ceftin which will end on 2/28/2020    Electronically signed by: Elissa Diaz CNP

## 2021-06-20 NOTE — LETTER
Letter by Elissa Diaz CNP at      Author: Elissa Diaz CNP Service: -- Author Type: --    Filed:  Encounter Date: 3/10/2020 Status: (Other)         Patient: Federico Epps   MR Number: [id number]   YOB: 1930   Date of Visit: 3/10/2020     [facility]    Facility:   [facility] [[id number]]   Code Status: DNR      CHIEF COMPLAINT/REASON FOR VISIT:  Chief Complaint   Patient presents with   ? Review Of Multiple Medical Conditions       HISTORY:      HPI: Federico is a 89 y.o. male undergoing physical and occupational therapy at [facility] transitional care unit.  He is with past medical history significant for hypertension, prostate cancer, dementia,  renal insufficiency stage 3, who presented to the ER after sustaining a fall a few days prior at his care facility. He was brought to the ER on 2/18 for reports of leg weakness. Of note he is essentially wheelchair bound. He was found to have a UTI and placed on rocephin and will complete a course of Ceftin on  2/28/20.     Today he is seen for a routine medical visit to review multiple medical issues.  His left  fifth toe is healed and foot soaks were stopped.     He denied chest pain or shortness of breath, denied constipation or diarrhea, he denied any pain or burning. He completed  Ceftin on  2/28/2020 for UTI.  He was alert and oriented to self only.  Vital signs are stable.  He is mostly wheelchair-bound he does continue to have left leg weakness.  His weights have been stable over the last week     Past Medical History:   Diagnosis Date   ? Cholelithiases     pt. unaware   ? CTS (carpal tunnel syndrome)     bilateral   ? HTN (hypertension)    ? IGT (impaired glucose tolerance)    ? Loss of hearing     bilateral hearing aids   ? Prostate cancer (H)    ? Renal insufficiency    ? Renal insufficiency              Family History   Problem Relation Age of Onset   ? Cancer Father    ?  Heart disease Father    ? Heart disease Sister    ? Stroke Brother    ? Heart disease Sister      Social History     Socioeconomic History   ? Marital status:      Spouse name: Not on file   ? Number of children: Not on file   ? Years of education: Not on file   ? Highest education level: Not on file   Occupational History   ? Not on file   Social Needs   ? Financial resource strain: Not on file   ? Food insecurity     Worry: Not on file     Inability: Not on file   ? Transportation needs     Medical: Not on file     Non-medical: Not on file   Tobacco Use   ? Smoking status: Never Smoker   ? Smokeless tobacco: Never Used   Substance and Sexual Activity   ? Alcohol use: Yes     Comment: once a month or less   ? Drug use: No   ? Sexual activity: Not on file   Lifestyle   ? Physical activity     Days per week: Not on file     Minutes per session: Not on file   ? Stress: Not on file   Relationships   ? Social connections     Talks on phone: Not on file     Gets together: Not on file     Attends [employer] service: Not on file     Active member of club or organization: Not on file     Attends meetings of clubs or organizations: Not on file     Relationship status: Not on file   ? Intimate partner violence     Fear of current or ex partner: Not on file     Emotionally abused: Not on file     Physically abused: Not on file     Forced sexual activity: Not on file   Other Topics Concern   ? Not on file   Social History Narrative   ? Not on file     Limited due to cognition chart also reviewed and spoke with nursing    Review of Systems   Constitutional: Positive for fatigue. Negative for activity change, appetite change, chills and fever.   HENT: Negative for congestion and sore throat.    Eyes: Negative for visual disturbance.   Respiratory: Negative for cough, shortness of breath and wheezing.    Cardiovascular: Negative for chest pain and leg swelling.   Gastrointestinal: Negative for abdominal distention, abdominal  pain, constipation, diarrhea and nausea.   Genitourinary: Negative for dysuria.        Completed treatment  For a  UTI   Musculoskeletal: Negative for arthralgias and myalgias.   Skin: Negative for color change, rash and wound.   Neurological: Negative for dizziness, weakness and numbness.   Psychiatric/Behavioral: Negative for agitation, behavioral problems and sleep disturbance.       Vitals:    03/10/20 0824   BP: 110/66   Pulse: 82   Resp: 20   Temp: 98.1  F (36.7  C)   SpO2: 95%   Weight: 145 lb 9.6 oz (66 kg)       Physical Exam  Constitutional:       Appearance: He is well-developed.      Comments: Pleasantly confused gentleman in no acute distress   HENT:      Head: Normocephalic.   Eyes:      Conjunctiva/sclera: Conjunctivae normal.   Neck:      Musculoskeletal: Normal range of motion.   Cardiovascular:      Rate and Rhythm: Normal rate and regular rhythm.      Heart sounds: Normal heart sounds. No murmur.   Pulmonary:      Effort: No respiratory distress.      Breath sounds: Normal breath sounds. No wheezing or rales.   Abdominal:      General: Bowel sounds are normal. There is no distension.      Palpations: Abdomen is soft.      Tenderness: There is no abdominal tenderness.   Musculoskeletal: Normal range of motion.   Skin:     General: Skin is warm.   Neurological:      Mental Status: He is alert and oriented to person, place, and time.   Psychiatric:         Behavior: Behavior normal.           LABS:   No results found for this or any previous visit (from the past 240 hour(s)).  Current Outpatient Medications   Medication Sig Note   ? acetaminophen (TYLENOL) 500 MG tablet Take 1,000 mg by mouth 3 (three) times a day as needed for pain.    ? aspirin 81 MG EC tablet Take 81 mg by mouth every evening.           ? dimethicone/zinc oxide (GEOVANNI PROTECT TOP) Apply 1 application topically. Apple to affected area daily skin lesion    ? docoshexanoic acid-eicosapent 500 mg (FISH OIL) 500-100 mg cap capsule Take  500 mg by mouth daily.    ? enzalutamide (XTANDI) 40 mg cap Take 1 tablet by mouth daily. 2/24/2020: On hold until seen by oncology    ? leuprolide (LUPRON) 30 mg injection Inject 30 mg into the shoulder, thigh, or buttocks every 6 (six) months.  2/24/2020: On hold in the TCU- due next 3/20/20   ? multivitamin therapeutic tablet Take 1 tablet by mouth daily.      ASSESSMENT:      ICD-10-CM    1. Weakness  R53.1    2. Failure to thrive in adult  R62.7    3. Urinary tract infection without hematuria, site unspecified  N39.0        PLAN:      Left fifth toe nail damaged currently healed him foot soaks were stopped    Weakness PT OT    Malnutrition dietary to follow    Prostate cancer Xtandi and leuprolide currently on hold in the transitional care unit patient to follow-up with urology    UTI completed  Ceftin  on 2/28/2020  Electronically signed by: Elissa Diaz CNP

## 2021-06-20 NOTE — LETTER
Letter by Elissa Diaz CNP at      Author: Elissa Diaz CNP Service: -- Author Type: --    Filed:  Encounter Date: 3/25/2020 Status: (Other)         Patient: Federico Epps   MR Number: [id number]   YOB: 1930   Date of Visit: 3/25/2020     [facility]    Facility:   [facility] [[id number]]   Code Status: DNR  PCP: Jaime Watkins MD   Phone: [phone]   Fax: [phone]      CHIEF COMPLAINT/REASON FOR VISIT:  Chief Complaint   Patient presents with   ? Discharge Summary       HISTORY COURSE:  Federico is a 89 y.o. male undergoing physical and occupational therapy at [facility] transitional care unit.  He is with past medical history significant for hypertension, prostate cancer, dementia,  renal insufficiency stage 3, who presented to the ER after sustaining a fall a few days prior at his care facility. He was brought to the ER on 2/18 for reports of leg weakness. Of note he is essentially wheelchair bound. He was found to have a UTI and placed on rocephin and will completed a course of Ceftin on  2/28/20.      Today he is seen for a face to face for  discharge. He was scheduled to discharge on 3/27/20 back to [facility].with current medications and treatments however as I was writing his discharge orders a call came in from the facility that they were unable to take him back without another assessment. His discharge has been delayed.      He denied chest pain or shortness of breath, denied constipation or diarrhea, he denied any pain or burning. He completed  Ceftin on  2/28/2020 for UTI.  He is alert and oriented to self only.  Vital signs are stable.  He is mostly wheelchair-bound he does continue to have left leg weakness.      Review of Systems  Constitutional: Positive for fatigue. Negative for activity change, appetite change, chills and fever.   HENT: Negative for congestion and sore throat.    Eyes: Negative for visual  disturbance.   Respiratory: Negative for cough, shortness of breath and wheezing.    Cardiovascular: Negative for chest pain and leg swelling.   Gastrointestinal: Negative for abdominal distention, abdominal pain, constipation, diarrhea and nausea.   Genitourinary: Negative for dysuria.        Completed treatment  For a  UTI   Musculoskeletal: Negative for arthralgias and myalgias.   Skin: Negative for color change, rash and wound.   Neurological: Negative for dizziness, weakness and numbness.   Psychiatric/Behavioral: Negative for agitation, behavioral problems and sleep disturbance.   Vitals:    03/25/20 0954   BP: 142/70   Pulse: 70   Resp: 16   Temp: 98.3  F (36.8  C)   SpO2: 97%   Weight: 142 lb 1.6 oz (64.5 kg)       Physical Exam  Constitutional:       Appearance: He is well-developed.      Comments: Pleasantly confused gentleman in no acute distress   HENT:      Head: Normocephalic.   Eyes:      Conjunctiva/sclera: Conjunctivae normal.   Neck:      Musculoskeletal: Normal range of motion.   Cardiovascular:      Rate and Rhythm: Normal rate and regular rhythm.      Heart sounds: Normal heart sounds. No murmur.   Pulmonary:      Effort: No respiratory distress.      Breath sounds: Normal breath sounds. No wheezing or rales.   Abdominal:      General: Bowel sounds are normal. There is no distension.      Palpations: Abdomen is soft.      Tenderness: There is no abdominal tenderness.   Musculoskeletal: Normal range of motion.   Skin:     General: Skin is warm.      Comments: Excoriated bottom.    Neurological:      Mental Status: He is alert and oriented to person, place, and time.   Psychiatric:         Behavior: Behavior normal.   MEDICATION LIST:  Current Outpatient Medications   Medication Sig   ? acetaminophen (TYLENOL) 500 MG tablet Take 1,000 mg by mouth 3 (three) times a day as needed for pain.   ? aspirin 81 MG EC tablet Take 81 mg by mouth every evening.          ? dimethicone/zinc oxide (GEOVANNI PROTECT  TOP) Apply 1 application topically. Apple to affected area daily skin lesion   ? docoshexanoic acid-eicosapent 500 mg (FISH OIL) 500-100 mg cap capsule Take 500 mg by mouth daily.   ? multivitamin therapeutic tablet Take 1 tablet by mouth daily.   ? enzalutamide (XTANDI) 40 mg cap Take 1 tablet by mouth daily.   ? leuprolide (LUPRON) 30 mg injection Inject 30 mg into the shoulder, thigh, or buttocks every 6 (six) months.        DISCHARGE DIAGNOSIS:    ICD-10-CM    1. Essential hypertension  I10    2. Weakness  R53.1    3. Failure to thrive in adult  R62.7        Left fifth toe nail damaged healed      Weakness PT OT     Malnutrition dietary to follow     Prostate cancer Xtandi and leuprolide currently on hold in the transitional care unit patient to follow-up with oncology.      UTI completed  Ceftin  on 2/28/2020     Buttock Excoriation- prior home cream resumed. Facility wound care team is currently following him weekly.     Electronically signed by: Elissa Diaz CNP

## 2021-06-20 NOTE — LETTER
Letter by Elissa Diaz CNP at      Author: Elissa Diaz CNP Service: -- Author Type: --    Filed:  Encounter Date: 2/24/2020 Status: (Other)         Patient: Federico Epps   MR Number: [id number]   YOB: 1930   Date of Visit: 2/24/2020     [facility]    Facility:   [facility] [[id number]]   Code Status: DNR      CHIEF COMPLAINT/REASON FOR VISIT:  Chief Complaint   Patient presents with   ? Review Of Multiple Medical Conditions       HISTORY:      HPI: Federico is a 89 y.o. male undergoing physical and occupational therapy at [facility] transitional care unit.  He is with past medical history significant for hypertension, prostate cancer, dementia,  renal insufficiency stage 3, who presented to the ER after sustaining a fall a few days prior at his care facility. He was brought to the ER on 2/18 for reports of leg weakness. Of note he is essentially wheelchair bound. He was found to have a UTI and placed on rocephin and will complete a course of Ceftin on  2/28/20.     Today he is seen for first routine visit to review multiple medical issues.  He denied chest pain or shortness of breath, denied constipation or diarrhea, he denied any pain or burning however he is being treated with Ceftin until 2/28/2020 for UTI.  He was alert and oriented to self only.  Vital signs are stable.    Past Medical History:   Diagnosis Date   ? Cholelithiases     pt. unaware   ? CTS (carpal tunnel syndrome)     bilateral   ? HTN (hypertension)    ? IGT (impaired glucose tolerance)    ? Loss of hearing     bilateral hearing aids   ? Prostate cancer (H)    ? Renal insufficiency    ? Renal insufficiency              Family History   Problem Relation Age of Onset   ? Cancer Father    ? Heart disease Father    ? Heart disease Sister    ? Stroke Brother    ? Heart disease Sister      Social History     Socioeconomic History   ? Marital status:       Spouse name: Not on file   ? Number of children: Not on file   ? Years of education: Not on file   ? Highest education level: Not on file   Occupational History   ? Not on file   Social Needs   ? Financial resource strain: Not on file   ? Food insecurity:     Worry: Not on file     Inability: Not on file   ? Transportation needs:     Medical: Not on file     Non-medical: Not on file   Tobacco Use   ? Smoking status: Never Smoker   ? Smokeless tobacco: Never Used   Substance and Sexual Activity   ? Alcohol use: Yes     Comment: once a month or less   ? Drug use: No   ? Sexual activity: Not on file   Lifestyle   ? Physical activity:     Days per week: Not on file     Minutes per session: Not on file   ? Stress: Not on file   Relationships   ? Social connections:     Talks on phone: Not on file     Gets together: Not on file     Attends [employer] service: Not on file     Active member of club or organization: Not on file     Attends meetings of clubs or organizations: Not on file     Relationship status: Not on file   ? Intimate partner violence:     Fear of current or ex partner: Not on file     Emotionally abused: Not on file     Physically abused: Not on file     Forced sexual activity: Not on file   Other Topics Concern   ? Not on file   Social History Narrative   ? Not on file     Limited due to cognition chart also reviewed and spoke with nursing    Review of Systems   Constitutional: Positive for fatigue. Negative for activity change, appetite change, chills and fever.   HENT: Negative for congestion and sore throat.    Eyes: Negative for visual disturbance.   Respiratory: Negative for cough, shortness of breath and wheezing.    Cardiovascular: Negative for chest pain and leg swelling.   Gastrointestinal: Negative for abdominal distention, abdominal pain, constipation, diarrhea and nausea.   Genitourinary: Negative for dysuria.        Currently being treated for UTI   Musculoskeletal: Negative for arthralgias  and myalgias.   Skin: Negative for color change, rash and wound.   Neurological: Negative for dizziness, weakness and numbness.   Psychiatric/Behavioral: Negative for agitation, behavioral problems and sleep disturbance.       Vitals:    02/24/20 0858   BP: 124/62   Pulse: 80   Resp: 20   Temp: 98.4  F (36.9  C)   SpO2: 96%       Physical Exam  Constitutional:       Appearance: He is well-developed.      Comments: Pleasantly confused gentleman in no acute distress   HENT:      Head: Normocephalic.   Eyes:      Conjunctiva/sclera: Conjunctivae normal.   Neck:      Musculoskeletal: Normal range of motion.   Cardiovascular:      Rate and Rhythm: Normal rate and regular rhythm.      Heart sounds: Normal heart sounds. No murmur.   Pulmonary:      Effort: No respiratory distress.      Breath sounds: Normal breath sounds. No wheezing or rales.   Abdominal:      General: Bowel sounds are normal. There is no distension.      Palpations: Abdomen is soft.      Tenderness: There is no abdominal tenderness.   Musculoskeletal: Normal range of motion.   Skin:     General: Skin is warm.   Neurological:      Mental Status: He is alert and oriented to person, place, and time.   Psychiatric:         Behavior: Behavior normal.           LABS:   No results found for this or any previous visit (from the past 240 hour(s)).  Current Outpatient Medications   Medication Sig Note   ? acetaminophen (TYLENOL) 500 MG tablet Take 1,000 mg by mouth 3 (three) times a day as needed for pain.    ? aspirin 81 MG EC tablet Take 81 mg by mouth every evening.           ? cefuroxime (CEFTIN) 500 MG tablet Take 500 mg by mouth 2 (two) times a day.    ? dimethicone/zinc oxide (GEOVANNI PROTECT TOP) Apply 1 application topically. Apple to affected area daily skin lesion    ? docoshexanoic acid-eicosapent 500 mg (FISH OIL) 500-100 mg cap capsule Take 500 mg by mouth daily.    ? multivitamin therapeutic tablet Take 1 tablet by mouth daily.    ? enzalutamide  (XTANDI) 40 mg cap Take 1 tablet by mouth daily. 2/24/2020: On hold until seen by oncology    ? leuprolide (LUPRON) 30 mg injection Inject 30 mg into the shoulder, thigh, or buttocks every 6 (six) months.  2/24/2020: On hold in the TCU- due next 3/20/20     ASSESSMENT:      ICD-10-CM    1. Weakness R53.1    2. Moderate protein-calorie malnutrition (H) E44.0    3. Prostate cancer (H) C61    4. Urinary tract infection without hematuria, site unspecified N39.0        PLAN:    Weakness PT OT    Malnutrition dietary to follow    Prostate cancer Xtandi and leuprolide currently on hold in the transitional care unit patient to follow-up with urology    UTI currently on Ceftin which will end on 2/28/2020    Electronically signed by: Elissa Diaz CNP